# Patient Record
Sex: FEMALE | Race: OTHER | NOT HISPANIC OR LATINO
[De-identification: names, ages, dates, MRNs, and addresses within clinical notes are randomized per-mention and may not be internally consistent; named-entity substitution may affect disease eponyms.]

---

## 2018-01-17 ENCOUNTER — APPOINTMENT (OUTPATIENT)
Dept: ANTEPARTUM | Facility: CLINIC | Age: 48
End: 2018-01-17
Payer: SELF-PAY

## 2018-01-17 ENCOUNTER — ASOB RESULT (OUTPATIENT)
Age: 48
End: 2018-01-17

## 2018-01-17 PROBLEM — Z00.00 ENCOUNTER FOR PREVENTIVE HEALTH EXAMINATION: Status: ACTIVE | Noted: 2018-01-17

## 2018-01-17 PROCEDURE — 76830 TRANSVAGINAL US NON-OB: CPT

## 2018-01-17 PROCEDURE — 76857 US EXAM PELVIC LIMITED: CPT

## 2019-05-15 ENCOUNTER — ASOB RESULT (OUTPATIENT)
Age: 49
End: 2019-05-15

## 2019-05-15 ENCOUNTER — APPOINTMENT (OUTPATIENT)
Dept: ANTEPARTUM | Facility: CLINIC | Age: 49
End: 2019-05-15
Payer: SELF-PAY

## 2019-05-15 PROCEDURE — 76830 TRANSVAGINAL US NON-OB: CPT

## 2019-05-15 PROCEDURE — 76857 US EXAM PELVIC LIMITED: CPT | Mod: 59

## 2022-07-07 ENCOUNTER — NON-APPOINTMENT (OUTPATIENT)
Age: 52
End: 2022-07-07

## 2022-12-13 ENCOUNTER — APPOINTMENT (OUTPATIENT)
Dept: PULMONOLOGY | Facility: CLINIC | Age: 52
End: 2022-12-13

## 2023-02-16 ENCOUNTER — APPOINTMENT (OUTPATIENT)
Dept: PULMONOLOGY | Facility: CLINIC | Age: 53
End: 2023-02-16
Payer: MEDICAID

## 2023-02-16 VITALS
WEIGHT: 172 LBS | RESPIRATION RATE: 16 BRPM | HEIGHT: 63 IN | HEART RATE: 61 BPM | DIASTOLIC BLOOD PRESSURE: 70 MMHG | BODY MASS INDEX: 30.48 KG/M2 | SYSTOLIC BLOOD PRESSURE: 122 MMHG | OXYGEN SATURATION: 98 %

## 2023-02-16 DIAGNOSIS — Z82.49 FAMILY HISTORY OF ISCHEMIC HEART DISEASE AND OTHER DISEASES OF THE CIRCULATORY SYSTEM: ICD-10-CM

## 2023-02-16 DIAGNOSIS — Z86.39 PERSONAL HISTORY OF OTHER ENDOCRINE, NUTRITIONAL AND METABOLIC DISEASE: ICD-10-CM

## 2023-02-16 DIAGNOSIS — K21.9 GASTRO-ESOPHAGEAL REFLUX DISEASE W/OUT ESOPHAGITIS: ICD-10-CM

## 2023-02-16 DIAGNOSIS — Z86.018 PERSONAL HISTORY OF OTHER BENIGN NEOPLASM: ICD-10-CM

## 2023-02-16 DIAGNOSIS — Z86.79 PERSONAL HISTORY OF OTHER DISEASES OF THE CIRCULATORY SYSTEM: ICD-10-CM

## 2023-02-16 PROCEDURE — 99204 OFFICE O/P NEW MOD 45 MIN: CPT

## 2023-02-16 RX ORDER — METFORMIN ER 500 MG 500 MG/1
500 TABLET ORAL
Refills: 0 | Status: ACTIVE | COMMUNITY

## 2023-02-16 RX ORDER — PANTOPRAZOLE 40 MG/1
40 TABLET, DELAYED RELEASE ORAL
Refills: 0 | Status: ACTIVE | COMMUNITY

## 2023-02-16 RX ORDER — MULTIVIT-MIN/IRON/FOLIC ACID/K 18-600-40
CAPSULE ORAL
Refills: 0 | Status: ACTIVE | COMMUNITY

## 2023-02-16 RX ORDER — SEMAGLUTIDE 1.34 MG/ML
INJECTION, SOLUTION SUBCUTANEOUS
Refills: 0 | Status: ACTIVE | COMMUNITY

## 2023-02-16 RX ORDER — ASPIRIN 81 MG
81 TABLET, DELAYED RELEASE (ENTERIC COATED) ORAL
Refills: 0 | Status: ACTIVE | COMMUNITY

## 2023-02-16 RX ORDER — LEVOTHYROXINE SODIUM 0.07 MG/1
75 TABLET ORAL
Refills: 0 | Status: ACTIVE | COMMUNITY

## 2023-02-16 RX ORDER — METFORMIN HYDROCHLORIDE 500 MG/1
500 TABLET, COATED ORAL
Refills: 0 | Status: DISCONTINUED | COMMUNITY
End: 2023-02-16

## 2023-02-16 RX ORDER — ROSUVASTATIN CALCIUM 10 MG/1
10 TABLET, FILM COATED ORAL
Refills: 0 | Status: ACTIVE | COMMUNITY

## 2023-02-16 RX ORDER — PNV NO.95/FERROUS FUM/FOLIC AC 28MG-0.8MG
100 TABLET ORAL
Refills: 0 | Status: ACTIVE | COMMUNITY

## 2023-02-16 NOTE — ASSESSMENT
[FreeTextEntry1] : Long hx of asthma. Intermittent symptoms. Complicated by GERD, obesity. Some back and chest discomfort. Had cardiac w/u. Possible BARON.

## 2023-02-16 NOTE — HISTORY OF PRESENT ILLNESS
[Never] : never [TextBox_4] : Hx of asthma since childhood. Never hospitalized.\par \par Normally maintaned on prn albuterol. Needing it about twice per month. Main problem is in winter with wheezing. Some EDEN with heavier exertion like stairs. Occasional back and chest discomfort when laying down at night; mostly back. \par \par Saw cardiology Dr Abraham Gaitan; reports testing showed CAD but no intervention needed. \par \par Has hoarse voice; has ENT eval planned.\par \par She has GERD. On pantoprazole. \par \par She has EDS with ESS 11; does not know about snoring.\par \par Alot of stress; taking care of parents, works as H[employer].\par \par Never smoked. [ESS] : 11

## 2023-02-16 NOTE — PHYSICAL EXAM
[No Acute Distress] : no acute distress [Low Lying Soft Palate] : low lying soft palate [Elongated Uvula] : elongated uvula [Enlarged Base of the Tongue] : enlarged base of the tongue [III] : Mallampati Class: III [Supple] : supple [Normal Rate/Rhythm] : normal rate/rhythm [Normal S1, S2] : normal s1, s2 [No Resp Distress] : no resp distress [No Acc Muscle Use] : no acc muscle use [Clear to Auscultation Bilaterally] : clear to auscultation bilaterally [Benign] : benign [Normal Color/ Pigmentation] : normal color/ pigmentation [Oriented x3] : oriented x3 [Normal Mood] : normal mood [Normal Affect] : normal affect

## 2023-03-11 ENCOUNTER — OUTPATIENT (OUTPATIENT)
Dept: OUTPATIENT SERVICES | Facility: HOSPITAL | Age: 53
LOS: 1 days | End: 2023-03-11
Payer: COMMERCIAL

## 2023-03-11 DIAGNOSIS — G47.33 OBSTRUCTIVE SLEEP APNEA (ADULT) (PEDIATRIC): ICD-10-CM

## 2023-03-11 PROCEDURE — 95810 POLYSOM 6/> YRS 4/> PARAM: CPT | Mod: 26

## 2023-03-11 PROCEDURE — 95810 POLYSOM 6/> YRS 4/> PARAM: CPT

## 2023-04-11 ENCOUNTER — APPOINTMENT (OUTPATIENT)
Dept: PULMONOLOGY | Facility: CLINIC | Age: 53
End: 2023-04-11
Payer: MEDICAID

## 2023-04-11 VITALS — WEIGHT: 175 LBS | HEIGHT: 62 IN | BODY MASS INDEX: 32.2 KG/M2

## 2023-04-11 VITALS
OXYGEN SATURATION: 98 % | HEART RATE: 60 BPM | DIASTOLIC BLOOD PRESSURE: 70 MMHG | SYSTOLIC BLOOD PRESSURE: 130 MMHG | RESPIRATION RATE: 14 BRPM

## 2023-04-11 DIAGNOSIS — E66.9 OBESITY, UNSPECIFIED: ICD-10-CM

## 2023-04-11 DIAGNOSIS — R06.2 WHEEZING: ICD-10-CM

## 2023-04-11 PROCEDURE — 85018 HEMOGLOBIN: CPT | Mod: QW

## 2023-04-11 PROCEDURE — 94729 DIFFUSING CAPACITY: CPT

## 2023-04-11 PROCEDURE — 94010 BREATHING CAPACITY TEST: CPT

## 2023-04-11 PROCEDURE — 94727 GAS DIL/WSHOT DETER LNG VOL: CPT

## 2023-04-11 PROCEDURE — 99214 OFFICE O/P EST MOD 30 MIN: CPT | Mod: 25

## 2023-04-11 NOTE — PROCEDURE
[FreeTextEntry1] : PFT done today: normal\par \par CXR done 2/28/23: clear\par \par sleep study done 3/11/23: no BARON\par \par Labwork 2/28/23:\par d-dimer : 0.41\par CBC WNL\par Eos WNL\par Allergy panel: eggs, milk\par IgE 34\par Environmental allergies neg\par \par Labwor\par \par

## 2023-04-11 NOTE — HISTORY OF PRESENT ILLNESS
[Never] : never [TextBox_4] : Hx of asthma since childhood. Never hospitalized.\par \par Normally maintaned on prn albuterol. Needing it about twice per month. Main problem is in winter with wheezing. Some EDEN with heavier exertion like stairs. Occasional back and chest discomfort when laying down at night; mostly back. \par \par Saw cardiology Dr Abraham Gaitan; reports testing showed CAD but no intervention needed. \par \par Main complaint was cough, soar thraot, phlegm in throat. Given abx and prednisone by PCP. Has ENT eval planned. \par \par PFT and CXR normal. \par \par Feels well now.  \par \par She has GERD. On pantoprazole. \par \par She has EDS with ESS 11; does not know about snoring. Sleep study neg for BARON.\par \par Alot of stress; taking care of parents, works as HHA.\par \par Never smoked. [Lab] : lab [TextBox_100] : 3/11/23 [TextBox_108] : 1.9 [ESS] : 11

## 2023-04-11 NOTE — ASSESSMENT
[FreeTextEntry1] : Long hx of asthma. Intermittent symptoms. Complicated by GERD, obesity. Some back and chest discomfort. Had cardiac w/u.  Cough could be from upper airway source.

## 2023-09-07 ENCOUNTER — NON-APPOINTMENT (OUTPATIENT)
Age: 53
End: 2023-09-07

## 2023-09-07 ENCOUNTER — APPOINTMENT (OUTPATIENT)
Dept: ORTHOPEDIC SURGERY | Facility: CLINIC | Age: 53
End: 2023-09-07
Payer: MEDICAID

## 2023-09-07 VITALS — WEIGHT: 174 LBS | HEIGHT: 62 IN | BODY MASS INDEX: 32.02 KG/M2

## 2023-09-07 PROCEDURE — 20610 DRAIN/INJ JOINT/BURSA W/O US: CPT | Mod: LT

## 2023-09-07 PROCEDURE — 73564 X-RAY EXAM KNEE 4 OR MORE: CPT | Mod: LT

## 2023-09-07 PROCEDURE — 99204 OFFICE O/P NEW MOD 45 MIN: CPT | Mod: 25

## 2023-09-07 NOTE — DISCUSSION/SUMMARY
[de-identified] : Left knee internal derangement  Extensive discussion of the natural history of this issue was had with the patient.  We discussed the treatment options focusing on conservative therapy which includes anti-inflammatories, physical therapy/home exercise, & activity modification.   -A prescription for meloxicam with the appropriate warnings for GI, cardiac, and renal side effects was given to the patient.  Patient was instructed not to use any other NSAIDs with this medication. Patient elected for steroid injection today.  Did discuss this may raise her blood sugars so she will be careful monitor closely for the next 2 days. Patient will return if the pain returns or does not resolve.  The patient's current medication management of their orthopedic diagnosis was reviewed today: (1) We discussed a comprehensive treatment plan that included possible pharmaceutical management involving the use of prescription strength medications including but not limited to options such as oral Ibuprofen 400mg QID, once daily Meloxicam 15 mg, or 500-650 mg Tylenol versus over the counter oral medications and topical prescription NSAID Pennsaid vs over the counter Voltaren gel. (2) There is a moderate risk of morbidity with further treatment, especially from use of prescription strength medications and possible side effects of these medications which include upset stomach with oral medications, skin reactions to topical medications and cardiac/renal issues with long term use. (3) I recommended that the patient follow-up with their medical physician to discuss any significant specific potential issues with long term medication use such as interactions with current medications or with exacerbation of underlying medical comorbidities. (4) The benefits and risks associated with use of injectable, oral or topical, prescription and over the counter anti-inflammatory medications were discussed with the patient. The patient voiced understanding of the risks including but not limited to bleeding, stroke, kidney dysfunction, heart disease, and were referred to the black box warning label for further information.

## 2023-09-07 NOTE — PHYSICAL EXAM
[de-identified] : General Appearance: normal without acute distress Mental: Alert and oriented x 3 Psych/affect: appropriate, cooperative Gait: Mildly antalgic gait  Left lower extremity Hip: Normal ROM without pain on IR/ER Knee Inspection: Mild effusion, no erythema. Wounds: None. Alignment: neutral. Palpation: Nontender palpation ROM active (in degrees): 5-90, pain with deep flexion and full extension Ligamentous laxity: stable to varus stress test, stable to valgus stress test Meniscal Test: Positive McMurrays, positive Catarina. Muscle Test: good quad strength. [de-identified] : 9/7/23: Left knee xrays, standing AP/Lateral and Merchant films, and 45 degree PA standing view taken today in the office are reviewed and demonstrate preserved joint space, no abnormalities

## 2023-09-07 NOTE — PROCEDURE
[de-identified] : 9/7/23: Left knee injection - Steroid The risks, benefits, and alternatives of the injection were reviewed/discussed with the patient today in office and all of their questions were answered. We discussed possible side effects and efficacy of this injection.  The patient consented to the procedure.  Site and side were verified with the patient.  The inferolateral injection site was prepped with chloroprep.  Cold spray was utilized to numb the skin. Utilizing sterile technique, the knee was injected with 1 ml 40 mg methylprednisolone, 4 ml 1% Lidocaine, 5 mL 0.25% Bupivicaine. A sterile bandage was applied. The patient tolerated the procedure well and there were no complications.
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
## 2023-10-03 ENCOUNTER — APPOINTMENT (OUTPATIENT)
Dept: PULMONOLOGY | Facility: CLINIC | Age: 53
End: 2023-10-03
Payer: MEDICAID

## 2023-10-03 VITALS
HEART RATE: 63 BPM | OXYGEN SATURATION: 98 % | SYSTOLIC BLOOD PRESSURE: 122 MMHG | WEIGHT: 173 LBS | DIASTOLIC BLOOD PRESSURE: 78 MMHG | HEIGHT: 62 IN | BODY MASS INDEX: 31.83 KG/M2 | RESPIRATION RATE: 14 BRPM

## 2023-10-03 DIAGNOSIS — J45.909 UNSPECIFIED ASTHMA, UNCOMPLICATED: ICD-10-CM

## 2023-10-03 DIAGNOSIS — R07.89 OTHER CHEST PAIN: ICD-10-CM

## 2023-10-03 DIAGNOSIS — R05.9 COUGH, UNSPECIFIED: ICD-10-CM

## 2023-10-03 PROCEDURE — 99214 OFFICE O/P EST MOD 30 MIN: CPT

## 2023-10-21 ENCOUNTER — APPOINTMENT (OUTPATIENT)
Dept: CT IMAGING | Facility: CLINIC | Age: 53
End: 2023-10-21
Payer: MEDICAID

## 2023-10-21 ENCOUNTER — OUTPATIENT (OUTPATIENT)
Dept: OUTPATIENT SERVICES | Facility: HOSPITAL | Age: 53
LOS: 1 days | End: 2023-10-21

## 2023-10-21 DIAGNOSIS — R07.89 OTHER CHEST PAIN: ICD-10-CM

## 2023-10-21 PROCEDURE — 71250 CT THORAX DX C-: CPT | Mod: 26

## 2023-11-07 ENCOUNTER — APPOINTMENT (OUTPATIENT)
Dept: ORTHOPEDIC SURGERY | Facility: CLINIC | Age: 53
End: 2023-11-07

## 2023-11-09 ENCOUNTER — RX RENEWAL (OUTPATIENT)
Age: 53
End: 2023-11-09

## 2023-11-14 ENCOUNTER — APPOINTMENT (OUTPATIENT)
Dept: ORTHOPEDIC SURGERY | Facility: CLINIC | Age: 53
End: 2023-11-14
Payer: MEDICAID

## 2023-11-14 VITALS
SYSTOLIC BLOOD PRESSURE: 124 MMHG | HEIGHT: 63 IN | DIASTOLIC BLOOD PRESSURE: 82 MMHG | WEIGHT: 171 LBS | HEART RATE: 66 BPM | BODY MASS INDEX: 30.3 KG/M2

## 2023-11-14 DIAGNOSIS — M54.16 RADICULOPATHY, LUMBAR REGION: ICD-10-CM

## 2023-11-14 PROCEDURE — 72110 X-RAY EXAM L-2 SPINE 4/>VWS: CPT

## 2023-11-14 PROCEDURE — 99204 OFFICE O/P NEW MOD 45 MIN: CPT | Mod: 25

## 2023-11-14 RX ORDER — GABAPENTIN 300 MG/1
300 CAPSULE ORAL
Qty: 30 | Refills: 1 | Status: ACTIVE | COMMUNITY
Start: 2023-11-14 | End: 1900-01-01

## 2023-11-20 ENCOUNTER — APPOINTMENT (OUTPATIENT)
Dept: ORTHOPEDIC SURGERY | Facility: CLINIC | Age: 53
End: 2023-11-20
Payer: MEDICAID

## 2023-11-20 VITALS
DIASTOLIC BLOOD PRESSURE: 77 MMHG | BODY MASS INDEX: 30.3 KG/M2 | WEIGHT: 171 LBS | HEIGHT: 63 IN | SYSTOLIC BLOOD PRESSURE: 154 MMHG | HEART RATE: 72 BPM

## 2023-11-20 DIAGNOSIS — M23.92 UNSPECIFIED INTERNAL DERANGEMENT OF LEFT KNEE: ICD-10-CM

## 2023-11-20 PROCEDURE — 99214 OFFICE O/P EST MOD 30 MIN: CPT

## 2023-11-20 RX ORDER — MELOXICAM 15 MG/1
15 TABLET ORAL
Qty: 30 | Refills: 0 | Status: ACTIVE | COMMUNITY
Start: 2023-09-07 | End: 1900-01-01

## 2023-12-14 ENCOUNTER — APPOINTMENT (OUTPATIENT)
Dept: ORTHOPEDIC SURGERY | Facility: CLINIC | Age: 53
End: 2023-12-14

## 2023-12-14 ENCOUNTER — APPOINTMENT (OUTPATIENT)
Dept: PULMONOLOGY | Facility: CLINIC | Age: 53
End: 2023-12-14

## 2023-12-20 ENCOUNTER — APPOINTMENT (OUTPATIENT)
Dept: ORTHOPEDIC SURGERY | Facility: CLINIC | Age: 53
End: 2023-12-20

## 2024-03-14 ENCOUNTER — APPOINTMENT (OUTPATIENT)
Dept: ORTHOPEDIC SURGERY | Facility: CLINIC | Age: 54
End: 2024-03-14

## 2024-03-25 DIAGNOSIS — M79.641 PAIN IN RIGHT HAND: ICD-10-CM

## 2024-03-26 ENCOUNTER — APPOINTMENT (OUTPATIENT)
Dept: ORTHOPEDIC SURGERY | Facility: CLINIC | Age: 54
End: 2024-03-26
Payer: COMMERCIAL

## 2024-03-26 DIAGNOSIS — M79.641 PAIN IN RIGHT HAND: ICD-10-CM

## 2024-03-26 PROCEDURE — 99215 OFFICE O/P EST HI 40 MIN: CPT | Mod: 25

## 2024-03-26 PROCEDURE — 73130 X-RAY EXAM OF HAND: CPT | Mod: RT

## 2024-03-26 PROCEDURE — 20526 THER INJECTION CARP TUNNEL: CPT | Mod: RT

## 2024-03-26 NOTE — ASSESSMENT
[FreeTextEntry1] : ASSESSMENT: The patient comes in today with chronic worsening exacerbated findings and symptoms consistent with carpal tunnel disease.  At this point we have discussed activity modification and she elects for an injection.  She has had left carpal tunnel release in the past   The patient was adequately and thoroughly informed of my assessment of their current condition(s).  - This may diminish bodily function for the extremity. We discussed prognosis, tx modalities including operative and nonoperative options for the above diagnostic assessment. As always, 2nd opinion is always provided as an option.  When accessible, I was able to review other physicians note(s) including reviewing other tests, imaging results as well as personally view these results for my own interpretation.   Injection:   The risks and benefits of a steroid injection were discussed in detail. The risks include but are not limited to: pain, infection, swelling, flare response, bleeding, subcutaneous fat atrophy, skin depigmentation and/or elevation of blood sugar. The risk of incomplete resolution of symptoms, recurrence and additional intervention was reviewed and considered by the patient. The patient agreed to proceed and under a sterile prep, I injected 1 unit 6mg into 1 cc of a combination of Celestone and Lidocaine into the right carpal tunnel. The patient tolerated the injection well. The patient was adequately and thoroughly informed of my assessment of their current condition(s).  DISCUSSION: 1.  Injection as above.  Activity modification. 2. [x] 3. [x]

## 2024-03-26 NOTE — PHYSICAL EXAM
[de-identified] : Examination of the [right] wrist reveals discomfort with compression at the level of the volar carpal tunnel eliciting numbness/tingling throughout the fingertips   [de-identified] : [4] views of [bilateral hands and wrists] were obtained today in my office and were seen by me and discussed with the patient.  These negative

## 2024-03-26 NOTE — REASON FOR VISIT
[Hand Pain] : hand pain [Initial Visit] : an initial visit for [Carpal Tunnel Syndrome] : Carpal Tunnel Syndrome

## 2024-03-26 NOTE — HISTORY OF PRESENT ILLNESS
[FreeTextEntry1] : Steff is a 53-year-old female who presents today with a longstanding chronic history of right wrist and hand discomfort numbness and tingling.  She has a history of left sided carpal tunnel release in the past.  It is inhibiting ADLs

## 2024-04-19 ENCOUNTER — APPOINTMENT (OUTPATIENT)
Dept: ORTHOPEDIC SURGERY | Facility: CLINIC | Age: 54
End: 2024-04-19
Payer: COMMERCIAL

## 2024-04-19 VITALS
SYSTOLIC BLOOD PRESSURE: 145 MMHG | DIASTOLIC BLOOD PRESSURE: 88 MMHG | BODY MASS INDEX: 30.3 KG/M2 | HEIGHT: 63 IN | HEART RATE: 70 BPM | WEIGHT: 171 LBS

## 2024-04-19 DIAGNOSIS — M43.10 SPONDYLOLISTHESIS, SITE UNSPECIFIED: ICD-10-CM

## 2024-04-19 DIAGNOSIS — S13.4XXA SPRAIN OF LIGAMENTS OF CERVICAL SPINE, INITIAL ENCOUNTER: ICD-10-CM

## 2024-04-19 DIAGNOSIS — M47.816 SPONDYLOSIS W/OUT MYELOPATHY OR RADICULOPATHY, LUMBAR REGION: ICD-10-CM

## 2024-04-19 PROCEDURE — 72050 X-RAY EXAM NECK SPINE 4/5VWS: CPT

## 2024-04-19 PROCEDURE — 99214 OFFICE O/P EST MOD 30 MIN: CPT

## 2024-04-19 RX ORDER — MELOXICAM 15 MG/1
15 TABLET ORAL DAILY
Qty: 30 | Refills: 2 | Status: ACTIVE | COMMUNITY
Start: 2024-04-19 | End: 1900-01-01

## 2024-04-19 RX ORDER — ACETAMINOPHEN 500 MG/1
500 TABLET ORAL 3 TIMES DAILY
Qty: 180 | Refills: 1 | Status: ACTIVE | COMMUNITY
Start: 2024-04-19 | End: 1900-01-01

## 2024-04-22 ENCOUNTER — NON-APPOINTMENT (OUTPATIENT)
Age: 54
End: 2024-04-22

## 2024-05-24 ENCOUNTER — APPOINTMENT (OUTPATIENT)
Dept: ORTHOPEDIC SURGERY | Facility: CLINIC | Age: 54
End: 2024-05-24
Payer: COMMERCIAL

## 2024-05-24 VITALS
HEIGHT: 63 IN | SYSTOLIC BLOOD PRESSURE: 124 MMHG | DIASTOLIC BLOOD PRESSURE: 75 MMHG | BODY MASS INDEX: 30.3 KG/M2 | WEIGHT: 171 LBS

## 2024-05-24 DIAGNOSIS — G56.00 CARPAL TUNNEL SYNDROME, UNSPECIFIED UPPER LIMB: ICD-10-CM

## 2024-05-24 DIAGNOSIS — M25.531 PAIN IN RIGHT WRIST: ICD-10-CM

## 2024-05-24 PROCEDURE — 99214 OFFICE O/P EST MOD 30 MIN: CPT | Mod: 25

## 2024-05-24 PROCEDURE — 20526 THER INJECTION CARP TUNNEL: CPT | Mod: RT

## 2024-05-24 NOTE — HISTORY OF PRESENT ILLNESS
[FreeTextEntry1] : Steff is a 53-year-old female who presents today with a longstanding chronic history of right wrist and hand discomfort numbness and tingling. She has a history of left sided carpal tunnel release in the past. It is inhibiting ADLs.  She describes tremendous relief with her injection last visit, however symptoms have returned.

## 2024-05-24 NOTE — PHYSICAL EXAM
[de-identified] : Examination of the [right] wrist reveals discomfort with compression at the level of the volar carpal tunnel eliciting numbness/tingling throughout the fingertips [de-identified] : [4] views of [bilateral hands and wrists] were reviewed today in my office and were seen by me and discussed with the patient. These negative.

## 2024-05-24 NOTE — ASSESSMENT
[FreeTextEntry1] : ASSESSMENT: The patient comes in today with chronic worsening exacerbated findings and symptoms consistent with carpal tunnel disease.  She describes some relief with her injection last visit, however symptoms have returned.  At this point we have discussed activity modification and she elects for a repeat injection. She has had left carpal tunnel release in the past.  The patient was adequately and thoroughly informed of my assessment of their current condition(s).  - This may diminish bodily function for the extremity.  We discussed prognosis, treatment modalities including operative and nonoperative options for the above diagnostic assessment. As always, 2nd opinion is always provided as an option. For this, when accessible, I was able to review other physicians note(s) including reviewing other tests, imaging results as well as personally view these results for my own interpretation.      Injection:   The risks and benefits of a steroid injection were discussed in detail. The risks include but are not limited to: pain, infection, swelling, flare response, bleeding, subcutaneous fat atrophy, skin depigmentation and/or elevation of blood sugar. The risk of incomplete resolution of symptoms, recurrence and additional intervention was reviewed and considered by the patient.  The patient agreed to proceed and under a sterile prep, I injected 1 unit (6mg) into 1 cc of a combination of Celestone and Lidocaine into the [right carpal tunnel]. The patient tolerated the injection well.   The patient was adequately and thoroughly informed of my assessment of their current condition(s).   DISCUSSION: 1.  Injection as above.  Activity modifications. 2. [x] 3. [x]

## 2024-09-11 ENCOUNTER — NON-APPOINTMENT (OUTPATIENT)
Age: 54
End: 2024-09-11

## 2024-09-19 ENCOUNTER — NON-APPOINTMENT (OUTPATIENT)
Age: 54
End: 2024-09-19

## 2024-09-26 ENCOUNTER — APPOINTMENT (OUTPATIENT)
Dept: ORTHOPEDIC SURGERY | Facility: CLINIC | Age: 54
End: 2024-09-26
Payer: COMMERCIAL

## 2024-09-26 VITALS — WEIGHT: 180 LBS | BODY MASS INDEX: 31.89 KG/M2 | HEIGHT: 63 IN

## 2024-09-26 DIAGNOSIS — M47.816 SPONDYLOSIS W/OUT MYELOPATHY OR RADICULOPATHY, LUMBAR REGION: ICD-10-CM

## 2024-09-26 DIAGNOSIS — M54.16 RADICULOPATHY, LUMBAR REGION: ICD-10-CM

## 2024-09-26 PROCEDURE — 99213 OFFICE O/P EST LOW 20 MIN: CPT

## 2024-09-26 RX ORDER — MELOXICAM 15 MG/1
15 TABLET ORAL DAILY
Qty: 30 | Refills: 1 | Status: ACTIVE | COMMUNITY
Start: 2024-09-26 | End: 1900-01-01

## 2024-09-26 NOTE — DISCUSSION/SUMMARY
[de-identified] : 54-year-old female with lumbar spondylosis and left lower extremity radiculopathy I discussed with Steff that she has been doing the symptoms for quite some time she responds really well to meloxicam and we will continue this on an as-needed basis however moving forward we will consider more advanced imaging if she does not respond to physical therapy and she has to continue to take the meloxicam A [facility] service referral was placed to assist with scheduling location I will see her back in 2 months and if her symptoms are still prominent then we will move forward with more advanced imaging such as a lumbar MRI
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
## 2024-12-18 ENCOUNTER — INPATIENT (INPATIENT)
Facility: HOSPITAL | Age: 54
LOS: 22 days | Discharge: HOME HEALTH | DRG: 563 | End: 2025-01-10
Attending: STUDENT IN AN ORGANIZED HEALTH CARE EDUCATION/TRAINING PROGRAM | Admitting: STUDENT IN AN ORGANIZED HEALTH CARE EDUCATION/TRAINING PROGRAM
Payer: SELF-PAY

## 2024-12-18 VITALS — WEIGHT: 184.97 LBS

## 2024-12-18 DIAGNOSIS — S82.891A OTHER FRACTURE OF RIGHT LOWER LEG, INITIAL ENCOUNTER FOR CLOSED FRACTURE: ICD-10-CM

## 2024-12-18 LAB
ALBUMIN SERPL ELPH-MCNC: 4.6 G/DL — SIGNIFICANT CHANGE UP (ref 3.3–5.2)
ALP SERPL-CCNC: 90 U/L — SIGNIFICANT CHANGE UP (ref 40–120)
ALT FLD-CCNC: 66 U/L — HIGH
ANION GAP SERPL CALC-SCNC: 15 MMOL/L — SIGNIFICANT CHANGE UP (ref 5–17)
APTT BLD: 25.3 SEC — SIGNIFICANT CHANGE UP (ref 24.5–35.6)
AST SERPL-CCNC: 66 U/L — HIGH
BASOPHILS # BLD AUTO: 0.03 K/UL — SIGNIFICANT CHANGE UP (ref 0–0.2)
BASOPHILS NFR BLD AUTO: 0.4 % — SIGNIFICANT CHANGE UP (ref 0–2)
BILIRUB SERPL-MCNC: 0.8 MG/DL — SIGNIFICANT CHANGE UP (ref 0.4–2)
BLD GP AB SCN SERPL QL: SIGNIFICANT CHANGE UP
BUN SERPL-MCNC: 15.4 MG/DL — SIGNIFICANT CHANGE UP (ref 8–20)
CALCIUM SERPL-MCNC: 10.2 MG/DL — SIGNIFICANT CHANGE UP (ref 8.4–10.5)
CHLORIDE SERPL-SCNC: 101 MMOL/L — SIGNIFICANT CHANGE UP (ref 96–108)
CO2 SERPL-SCNC: 24 MMOL/L — SIGNIFICANT CHANGE UP (ref 22–29)
CREAT SERPL-MCNC: 0.64 MG/DL — SIGNIFICANT CHANGE UP (ref 0.5–1.3)
EGFR: 106 ML/MIN/1.73M2 — SIGNIFICANT CHANGE UP
EOSINOPHIL # BLD AUTO: 0.17 K/UL — SIGNIFICANT CHANGE UP (ref 0–0.5)
EOSINOPHIL NFR BLD AUTO: 2.5 % — SIGNIFICANT CHANGE UP (ref 0–6)
ETHANOL SERPL-MCNC: <10 MG/DL — SIGNIFICANT CHANGE UP (ref 0–9)
GLUCOSE BLDC GLUCOMTR-MCNC: 187 MG/DL — HIGH (ref 70–99)
GLUCOSE SERPL-MCNC: 168 MG/DL — HIGH (ref 70–99)
HCT VFR BLD CALC: 38.5 % — SIGNIFICANT CHANGE UP (ref 34.5–45)
HGB BLD-MCNC: 12.9 G/DL — SIGNIFICANT CHANGE UP (ref 11.5–15.5)
IMM GRANULOCYTES NFR BLD AUTO: 0.7 % — SIGNIFICANT CHANGE UP (ref 0–0.9)
INR BLD: 0.95 RATIO — SIGNIFICANT CHANGE UP (ref 0.85–1.16)
LIDOCAIN IGE QN: 57 U/L — HIGH (ref 22–51)
LYMPHOCYTES # BLD AUTO: 3.14 K/UL — SIGNIFICANT CHANGE UP (ref 1–3.3)
LYMPHOCYTES # BLD AUTO: 46.7 % — HIGH (ref 13–44)
MCHC RBC-ENTMCNC: 29.3 PG — SIGNIFICANT CHANGE UP (ref 27–34)
MCHC RBC-ENTMCNC: 33.5 G/DL — SIGNIFICANT CHANGE UP (ref 32–36)
MCV RBC AUTO: 87.5 FL — SIGNIFICANT CHANGE UP (ref 80–100)
MONOCYTES # BLD AUTO: 0.64 K/UL — SIGNIFICANT CHANGE UP (ref 0–0.9)
MONOCYTES NFR BLD AUTO: 9.5 % — SIGNIFICANT CHANGE UP (ref 2–14)
NEUTROPHILS # BLD AUTO: 2.7 K/UL — SIGNIFICANT CHANGE UP (ref 1.8–7.4)
NEUTROPHILS NFR BLD AUTO: 40.2 % — LOW (ref 43–77)
PLATELET # BLD AUTO: 188 K/UL — SIGNIFICANT CHANGE UP (ref 150–400)
POTASSIUM SERPL-MCNC: 3.6 MMOL/L — SIGNIFICANT CHANGE UP (ref 3.5–5.3)
POTASSIUM SERPL-SCNC: 3.6 MMOL/L — SIGNIFICANT CHANGE UP (ref 3.5–5.3)
PROT SERPL-MCNC: 7.2 G/DL — SIGNIFICANT CHANGE UP (ref 6.6–8.7)
PROTHROM AB SERPL-ACNC: 10.7 SEC — SIGNIFICANT CHANGE UP (ref 9.9–13.4)
RBC # BLD: 4.4 M/UL — SIGNIFICANT CHANGE UP (ref 3.8–5.2)
RBC # FLD: 13.2 % — SIGNIFICANT CHANGE UP (ref 10.3–14.5)
SODIUM SERPL-SCNC: 140 MMOL/L — SIGNIFICANT CHANGE UP (ref 135–145)
WBC # BLD: 6.73 K/UL — SIGNIFICANT CHANGE UP (ref 3.8–10.5)
WBC # FLD AUTO: 6.73 K/UL — SIGNIFICANT CHANGE UP (ref 3.8–10.5)

## 2024-12-18 PROCEDURE — 71045 X-RAY EXAM CHEST 1 VIEW: CPT | Mod: 26

## 2024-12-18 PROCEDURE — 73600 X-RAY EXAM OF ANKLE: CPT | Mod: 26,RT

## 2024-12-18 PROCEDURE — 73090 X-RAY EXAM OF FOREARM: CPT | Mod: 26,LT

## 2024-12-18 PROCEDURE — 70496 CT ANGIOGRAPHY HEAD: CPT | Mod: 26,MC

## 2024-12-18 PROCEDURE — 74177 CT ABD & PELVIS W/CONTRAST: CPT | Mod: 26,MC

## 2024-12-18 PROCEDURE — 99284 EMERGENCY DEPT VISIT MOD MDM: CPT

## 2024-12-18 PROCEDURE — 70498 CT ANGIOGRAPHY NECK: CPT | Mod: 26,MC

## 2024-12-18 PROCEDURE — 70450 CT HEAD/BRAIN W/O DYE: CPT | Mod: 26,MC,59

## 2024-12-18 PROCEDURE — 73090 X-RAY EXAM OF FOREARM: CPT | Mod: 26,LT,77

## 2024-12-18 PROCEDURE — 99223 1ST HOSP IP/OBS HIGH 75: CPT | Mod: 57

## 2024-12-18 PROCEDURE — 72125 CT NECK SPINE W/O DYE: CPT | Mod: 26,MC

## 2024-12-18 PROCEDURE — 99291 CRITICAL CARE FIRST HOUR: CPT | Mod: 25

## 2024-12-18 PROCEDURE — 73110 X-RAY EXAM OF WRIST: CPT | Mod: 26,RT

## 2024-12-18 PROCEDURE — 73700 CT LOWER EXTREMITY W/O DYE: CPT | Mod: 26,RT

## 2024-12-18 PROCEDURE — 71260 CT THORAX DX C+: CPT | Mod: 26,MC

## 2024-12-18 PROCEDURE — 73110 X-RAY EXAM OF WRIST: CPT | Mod: 26,RT,77

## 2024-12-18 PROCEDURE — 99156 MOD SED OTH PHYS/QHP 5/>YRS: CPT

## 2024-12-18 PROCEDURE — 73610 X-RAY EXAM OF ANKLE: CPT | Mod: 26,RT

## 2024-12-18 RX ORDER — SODIUM CHLORIDE 9 MG/ML
1000 INJECTION, SOLUTION INTRAVENOUS
Refills: 0 | Status: DISCONTINUED | OUTPATIENT
Start: 2024-12-18 | End: 2024-12-19

## 2024-12-18 RX ORDER — DEXTROSE MONOHYDRATE 25 G/50ML
25 INJECTION, SOLUTION INTRAVENOUS ONCE
Refills: 0 | Status: DISCONTINUED | OUTPATIENT
Start: 2024-12-18 | End: 2024-12-19

## 2024-12-18 RX ORDER — PROPOFOL 10 MG/ML
80 INJECTION, EMULSION INTRAVENOUS ONCE
Refills: 0 | Status: COMPLETED | OUTPATIENT
Start: 2024-12-18 | End: 2024-12-18

## 2024-12-18 RX ORDER — METHOCARBAMOL 500 MG
750 TABLET ORAL EVERY 6 HOURS
Refills: 0 | Status: DISCONTINUED | OUTPATIENT
Start: 2024-12-18 | End: 2024-12-19

## 2024-12-18 RX ORDER — DEXTROSE MONOHYDRATE 25 G/50ML
12.5 INJECTION, SOLUTION INTRAVENOUS ONCE
Refills: 0 | Status: DISCONTINUED | OUTPATIENT
Start: 2024-12-18 | End: 2024-12-19

## 2024-12-18 RX ORDER — CEFAZOLIN SODIUM 1 G
2000 VIAL (EA) INJECTION EVERY 8 HOURS
Refills: 0 | Status: DISCONTINUED | OUTPATIENT
Start: 2024-12-18 | End: 2024-12-18

## 2024-12-18 RX ORDER — CLOSTRIDIUM TETANI TOXOID ANTIGEN (FORMALDEHYDE INACTIVATED), CORYNEBACTERIUM DIPHTHERIAE TOXOID ANTIGEN (FORMALDEHYDE INACTIVATED), BORDETELLA PERTUSSIS TOXOID ANTIGEN (GLUTARALDEHYDE INACTIVATED), BORDETELLA PERTUSSIS FILAMENTOUS HEMAGGLUTININ ANTIGEN (FORMALDEHYDE INACTIVATED), BORDETELLA PERTUSSIS PERTACTIN ANTIGEN, AND BORDETELLA PERTUSSIS FIMBRIAE 2/3 ANTIGEN 5; 2; 2.5; 5; 3; 5 [LF]/.5ML; [LF]/.5ML; UG/.5ML; UG/.5ML; UG/.5ML; UG/.5ML
0.5 INJECTION, SUSPENSION INTRAMUSCULAR ONCE
Refills: 0 | Status: COMPLETED | OUTPATIENT
Start: 2024-12-18 | End: 2024-12-18

## 2024-12-18 RX ORDER — SODIUM CHLORIDE 9 MG/ML
1000 INJECTION, SOLUTION INTRAVENOUS ONCE
Refills: 0 | Status: COMPLETED | OUTPATIENT
Start: 2024-12-18 | End: 2024-12-18

## 2024-12-18 RX ORDER — GLUCAGON INJECTION, SOLUTION 0.5 MG/.1ML
1 INJECTION, SOLUTION SUBCUTANEOUS ONCE
Refills: 0 | Status: DISCONTINUED | OUTPATIENT
Start: 2024-12-18 | End: 2024-12-19

## 2024-12-18 RX ORDER — CEFTRIAXONE SODIUM 1 G/1
2000 INJECTION, POWDER, FOR SOLUTION INTRAMUSCULAR; INTRAVENOUS EVERY 24 HOURS
Refills: 0 | Status: DISCONTINUED | OUTPATIENT
Start: 2024-12-19 | End: 2024-12-19

## 2024-12-18 RX ORDER — SODIUM CHLORIDE 9 MG/ML
1000 INJECTION, SOLUTION INTRAVENOUS
Refills: 0 | Status: DISCONTINUED | OUTPATIENT
Start: 2024-12-18 | End: 2024-12-20

## 2024-12-18 RX ORDER — CHLORHEXIDINE GLUCONATE 1.2 MG/ML
1 RINSE ORAL EVERY 12 HOURS
Refills: 0 | Status: DISCONTINUED | OUTPATIENT
Start: 2024-12-18 | End: 2024-12-19

## 2024-12-18 RX ORDER — VANCOMYCIN HYDROCHLORIDE 5 G/100ML
1250 INJECTION, POWDER, LYOPHILIZED, FOR SOLUTION INTRAVENOUS ONCE
Refills: 0 | Status: DISCONTINUED | OUTPATIENT
Start: 2024-12-19 | End: 2024-12-26

## 2024-12-18 RX ORDER — INFLUENZA A VIRUS A/WISCONSIN/588/2019 (H1N1) RECOMBINANT HEMAGGLUTININ ANTIGEN, INFLUENZA A VIRUS A/DARWIN/6/2021 (H3N2) RECOMBINANT HEMAGGLUTININ ANTIGEN, INFLUENZA B VIRUS B/AUSTRIA/1359417/2021 RECOMBINANT HEMAGGLUTININ ANTIGEN, AND INFLUENZA B VIRUS B/PHUKET/3073/2013 RECOMBINANT HEMAGGLUTININ ANTIGEN 45; 45; 45; 45 UG/.5ML; UG/.5ML; UG/.5ML; UG/.5ML
0.5 INJECTION INTRAMUSCULAR ONCE
Refills: 0 | Status: DISCONTINUED | OUTPATIENT
Start: 2024-12-18 | End: 2025-01-10

## 2024-12-18 RX ORDER — CEFTRIAXONE SODIUM 1 G/1
2000 INJECTION, POWDER, FOR SOLUTION INTRAMUSCULAR; INTRAVENOUS ONCE
Refills: 0 | Status: COMPLETED | OUTPATIENT
Start: 2024-12-18 | End: 2024-12-18

## 2024-12-18 RX ORDER — PROPOFOL 10 MG/ML
40 INJECTION, EMULSION INTRAVENOUS ONCE
Refills: 0 | Status: COMPLETED | OUTPATIENT
Start: 2024-12-18 | End: 2024-12-18

## 2024-12-18 RX ORDER — MUPIROCIN 2 %
1 OINTMENT (GRAM) TOPICAL
Refills: 0 | Status: DISCONTINUED | OUTPATIENT
Start: 2024-12-18 | End: 2024-12-19

## 2024-12-18 RX ORDER — ACETAMINOPHEN 80 MG/.8ML
1000 SOLUTION/ DROPS ORAL EVERY 6 HOURS
Refills: 0 | Status: COMPLETED | OUTPATIENT
Start: 2024-12-18 | End: 2024-12-19

## 2024-12-18 RX ORDER — FENTANYL 75 UG/H
50 PATCH, EXTENDED RELEASE TRANSDERMAL ONCE
Refills: 0 | Status: DISCONTINUED | OUTPATIENT
Start: 2024-12-18 | End: 2024-12-18

## 2024-12-18 RX ORDER — ENOXAPARIN SODIUM 60 MG/.6ML
40 INJECTION INTRAVENOUS; SUBCUTANEOUS ONCE
Refills: 0 | Status: COMPLETED | OUTPATIENT
Start: 2024-12-18 | End: 2024-12-18

## 2024-12-18 RX ORDER — OXYCODONE HCL 15 MG
10 TABLET ORAL EVERY 4 HOURS
Refills: 0 | Status: DISCONTINUED | OUTPATIENT
Start: 2024-12-18 | End: 2024-12-19

## 2024-12-18 RX ORDER — KETOROLAC TROMETHAMINE 30 MG/ML
15 INJECTION INTRAMUSCULAR; INTRAVENOUS EVERY 6 HOURS
Refills: 0 | Status: DISCONTINUED | OUTPATIENT
Start: 2024-12-18 | End: 2024-12-19

## 2024-12-18 RX ORDER — PROPOFOL 10 MG/ML
60 INJECTION, EMULSION INTRAVENOUS ONCE
Refills: 0 | Status: COMPLETED | OUTPATIENT
Start: 2024-12-18 | End: 2024-12-18

## 2024-12-18 RX ORDER — POVIDONE IODINE USP, 10% W/W 10 MG/ML
1 SWAB TOPICAL ONCE
Refills: 0 | Status: COMPLETED | OUTPATIENT
Start: 2024-12-18 | End: 2024-12-19

## 2024-12-18 RX ORDER — INSULIN LISPRO 100/ML
VIAL (ML) SUBCUTANEOUS
Refills: 0 | Status: DISCONTINUED | OUTPATIENT
Start: 2024-12-18 | End: 2024-12-19

## 2024-12-18 RX ORDER — PROPOFOL 10 MG/ML
20 INJECTION, EMULSION INTRAVENOUS ONCE
Refills: 0 | Status: COMPLETED | OUTPATIENT
Start: 2024-12-18 | End: 2024-12-18

## 2024-12-18 RX ORDER — DEXTROSE MONOHYDRATE 25 G/50ML
15 INJECTION, SOLUTION INTRAVENOUS ONCE
Refills: 0 | Status: DISCONTINUED | OUTPATIENT
Start: 2024-12-18 | End: 2024-12-19

## 2024-12-18 RX ORDER — OXYCODONE HCL 15 MG
5 TABLET ORAL EVERY 4 HOURS
Refills: 0 | Status: DISCONTINUED | OUTPATIENT
Start: 2024-12-18 | End: 2024-12-19

## 2024-12-18 RX ORDER — LIDOCAINE 50 MG/G
1 OINTMENT TOPICAL EVERY 24 HOURS
Refills: 0 | Status: DISCONTINUED | OUTPATIENT
Start: 2024-12-18 | End: 2024-12-19

## 2024-12-18 RX ORDER — LEVOTHYROXINE SODIUM 175 UG/1
100 TABLET ORAL
Refills: 0 | Status: DISCONTINUED | OUTPATIENT
Start: 2024-12-18 | End: 2024-12-19

## 2024-12-18 RX ADMIN — SODIUM CHLORIDE 100 MILLILITER(S): 9 INJECTION, SOLUTION INTRAVENOUS at 20:29

## 2024-12-18 RX ADMIN — SODIUM CHLORIDE 1000 MILLILITER(S): 9 INJECTION, SOLUTION INTRAVENOUS at 20:47

## 2024-12-18 RX ADMIN — LIDOCAINE 1 PATCH: 50 OINTMENT TOPICAL at 20:43

## 2024-12-18 RX ADMIN — FENTANYL 50 MICROGRAM(S): 75 PATCH, EXTENDED RELEASE TRANSDERMAL at 17:50

## 2024-12-18 RX ADMIN — CLOSTRIDIUM TETANI TOXOID ANTIGEN (FORMALDEHYDE INACTIVATED), CORYNEBACTERIUM DIPHTHERIAE TOXOID ANTIGEN (FORMALDEHYDE INACTIVATED), BORDETELLA PERTUSSIS TOXOID ANTIGEN (GLUTARALDEHYDE INACTIVATED), BORDETELLA PERTUSSIS FILAMENTOUS HEMAGGLUTININ ANTIGEN (FORMALDEHYDE INACTIVATED), BORDETELLA PERTUSSIS PERTACTIN ANTIGEN, AND BORDETELLA PERTUSSIS FIMBRIAE 2/3 ANTIGEN 0.5 MILLILITER(S): 5; 2; 2.5; 5; 3; 5 INJECTION, SUSPENSION INTRAMUSCULAR at 19:27

## 2024-12-18 RX ADMIN — Medication 750 MILLIGRAM(S): at 22:47

## 2024-12-18 RX ADMIN — PROPOFOL 80 MILLIGRAM(S): 10 INJECTION, EMULSION INTRAVENOUS at 18:54

## 2024-12-18 RX ADMIN — PROPOFOL 20 MILLIGRAM(S): 10 INJECTION, EMULSION INTRAVENOUS at 18:53

## 2024-12-18 RX ADMIN — ENOXAPARIN SODIUM 40 MILLIGRAM(S): 60 INJECTION INTRAVENOUS; SUBCUTANEOUS at 20:48

## 2024-12-18 RX ADMIN — ACETAMINOPHEN 400 MILLIGRAM(S): 80 SOLUTION/ DROPS ORAL at 22:47

## 2024-12-18 RX ADMIN — KETOROLAC TROMETHAMINE 15 MILLIGRAM(S): 30 INJECTION INTRAMUSCULAR; INTRAVENOUS at 22:47

## 2024-12-18 RX ADMIN — PROPOFOL 60 MILLIGRAM(S): 10 INJECTION, EMULSION INTRAVENOUS at 18:53

## 2024-12-18 RX ADMIN — PROPOFOL 40 MILLIGRAM(S): 10 INJECTION, EMULSION INTRAVENOUS at 18:55

## 2024-12-18 RX ADMIN — Medication 10 MILLIGRAM(S): at 21:09

## 2024-12-18 RX ADMIN — CEFTRIAXONE SODIUM 2000 MILLIGRAM(S): 1 INJECTION, POWDER, FOR SOLUTION INTRAMUSCULAR; INTRAVENOUS at 17:15

## 2024-12-18 RX ADMIN — PROPOFOL 40 MILLIGRAM(S): 10 INJECTION, EMULSION INTRAVENOUS at 18:57

## 2024-12-18 NOTE — ED PROVIDER NOTE - PHYSICAL EXAMINATION
Const: In mild painful distress  HEENT: Small abrasion to forehead. No scalp hematoma. Face is midline.   Eyes: 2 mm and PERRLA.. EOMI.  Neck:. No midline cervical tenderness, C collar in place.   Cardiac: Regular rate and regular rhythm. +S1/S2. No murmurs, rubs or gallops. Peripheral pulses 2+ and symmetric. No LE edema.  Resp: Speaking in full sentences, breath sounds equal and clear bilaterally. No wheezes, rales or rhonchi.  Abd: Soft, non-tender, non-distended.  No guarding or rebound.  MSK:   Tenderness over the anterior chest wall.  No underlying crepitus.  Deformity and pain of the right wrist, full range of motion of fingers.  Deformity and pain to right lateral malleoli with swelling, laceration overlying the area.  Neurovascularly intact  Neuro:  5 out of 5 strength, bilateral upper and lower extremities.  GCS 15
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
## 2024-12-18 NOTE — PROGRESS NOTE ADULT - NS ATTEND AMEND GEN_ALL_CORE FT
Orthopaedic Trauma Surgeon Addendum:    I have reviewed the physician assistant note and agree with the history, exam, and plan of care, except as noted.    Will plan on surgery tomorrow 12/19 for ORIF/I&D ankle, possibly ORIF BUE injuries    Vasile Patel MD  Orthopaedic Trauma Surgeon  [facility] [place]
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
## 2024-12-18 NOTE — ED PROCEDURE NOTE - ATTENDING CONTRIBUTION TO CARE
I have personally performed a history and physical examination of the patient and discussed management with the resident as well as the patient.  I reviewed the resident's note and agree with the documented findings and plan of care.  I have authored and modified critical sections of the Provider Note, including but not limited to HPI, Physical Exam and MDM.    Uncomplicated procedural sedation for fracture reduction of bilateral upper extremities and right lower extremity performed by orthopedics team.  Patient maintained normal oxygenation status and blood pressure throughout.  No aspiration event.  Patient woke from sedation without complication.
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
## 2024-12-18 NOTE — PROGRESS NOTE ADULT - SUBJECTIVE AND OBJECTIVE BOX
Post reduction films of the right distal radius show adequate alignment   Post splinting films of left ulnar shaft show fracture in stable positioning     FRACTURE REDUCTION  PROCEDURE NOTE: Fracture reduction   Performed by:  Susannah Odonnell RJ Lamberti   Indication: Acute fracture with displacement, requiring fracture reduction.  Consent: The risks and benefits of the procedure including incomplete reduction, nerve damage and bleeding were explained and the patient verbalized their understanding and wished to proceed with the procedure. Written consent was obtained following the discussion.  Universal Protocol: a time out was performed and the correct patient and site were verified   Procedure: Neurovascular exam intact prior to fracture reduction.  Skin exam : Distal fibula exposed through skin. Sub Q tissue present with bleeding. Anesthesia provided by ED department. Reduction of the right ankle fracture dislocation was accomplished via axial traction and careful manipulation. Following adequate reduction and alignment of the fractured bone, the fracture was immobilized with a  plaster splint. Distally, the extremity was neurovascular intact following the procedure.  The patient tolerated the procedure well.  Post reduction films obtained and demonstrated an adequate reduction.  Complications: None      Plan:   - Case discussed with Dr. Monte who reviewed imaging and gave plan regarding the LEFT ULNA and RIGHT DISTAL RADIUS   - Case discussed with Dr. Patel who reviewed imaging and gave plan regarding the RIGHT ANKLE FRACTURE DISLOCATION  - Care as per primary team   - Abx around the clock   - Patient to be NPO after midnight   - Patient to go to the OR with Dr. Patel tomorrow for right ankle fracture   - Keep bilateral UE splints and RLE clean, dry, and intact  - Keep RLE elevated to help with swelling
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
## 2024-12-18 NOTE — PROGRESS NOTE ADULT - SUBJECTIVE AND OBJECTIVE BOX
SPLINTING   PROCEDURE NOTE: Splinting  Performed by: Susannah Crawford PA-C   Indication: Fracture of left ulnar shaft  The left forearm was appropriately positioned. A plaster splint was applied. Distally, the extremity was neurovascular intact following the procedure. The patient tolerated the procedure well.
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
## 2024-12-18 NOTE — CONSULT NOTE ADULT - NS ATTEND AMEND GEN_ALL_CORE FT
Orthopaedic Trauma Surgeon Addendum:    I have reviewed the physician assistant note and agree with the history, exam, and plan of care, except as noted.    Orthopedic Surgery is ready to proceed with surgery pending medical optimization and adequate Operating Room availability. Risks of surgical delay discussed with other providers and staff. Will need to go to the OR semi-urgently for I&D/ORIF ankle and possibly ORIF bilateral upper extremities. Please call with any questions or concerns.     Vasile Patel MD  Orthopaedic Trauma Surgeon  [facility] [place]
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
## 2024-12-18 NOTE — ED PROVIDER NOTE - CARE PLAN
1 Principal Discharge DX:	Ankle fracture, right  Secondary Diagnosis:	Left [place] fracture  Secondary Diagnosis:	Distal radius fracture, right  Secondary Diagnosis:	Rib fracture
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
## 2024-12-18 NOTE — PATIENT PROFILE ADULT - FALL HARM RISK - RISK INTERVENTIONS

## 2024-12-19 ENCOUNTER — TRANSCRIPTION ENCOUNTER (OUTPATIENT)
Age: 54
End: 2024-12-19

## 2024-12-19 LAB
A1C WITH ESTIMATED AVERAGE GLUCOSE RESULT: 8.9 % — HIGH (ref 4–5.6)
ANION GAP SERPL CALC-SCNC: 14 MMOL/L — SIGNIFICANT CHANGE UP (ref 5–17)
APPEARANCE UR: CLEAR — SIGNIFICANT CHANGE UP
APPEARANCE UR: CLEAR — SIGNIFICANT CHANGE UP
BACTERIA # UR AUTO: ABNORMAL /HPF
BACTERIA # UR AUTO: ABNORMAL /HPF
BILIRUB UR-MCNC: NEGATIVE — SIGNIFICANT CHANGE UP
BILIRUB UR-MCNC: NEGATIVE — SIGNIFICANT CHANGE UP
BUN SERPL-MCNC: 17 MG/DL — SIGNIFICANT CHANGE UP (ref 8–20)
CALCIUM SERPL-MCNC: 9 MG/DL — SIGNIFICANT CHANGE UP (ref 8.4–10.5)
CAST: 0 /LPF — SIGNIFICANT CHANGE UP (ref 0–4)
CAST: 0 /LPF — SIGNIFICANT CHANGE UP (ref 0–4)
CHLORIDE SERPL-SCNC: 100 MMOL/L — SIGNIFICANT CHANGE UP (ref 96–108)
CO2 SERPL-SCNC: 22 MMOL/L — SIGNIFICANT CHANGE UP (ref 22–29)
COLOR SPEC: YELLOW — SIGNIFICANT CHANGE UP
COLOR SPEC: YELLOW — SIGNIFICANT CHANGE UP
CREAT SERPL-MCNC: 0.49 MG/DL — LOW (ref 0.5–1.3)
DIFF PNL FLD: NEGATIVE — SIGNIFICANT CHANGE UP
DIFF PNL FLD: NEGATIVE — SIGNIFICANT CHANGE UP
EGFR: 113 ML/MIN/1.73M2 — SIGNIFICANT CHANGE UP
ESTIMATED AVERAGE GLUCOSE: 209 MG/DL — HIGH (ref 68–114)
GLUCOSE BLDC GLUCOMTR-MCNC: 162 MG/DL — HIGH (ref 70–99)
GLUCOSE BLDC GLUCOMTR-MCNC: 189 MG/DL — HIGH (ref 70–99)
GLUCOSE BLDC GLUCOMTR-MCNC: 210 MG/DL — HIGH (ref 70–99)
GLUCOSE BLDC GLUCOMTR-MCNC: 220 MG/DL — HIGH (ref 70–99)
GLUCOSE BLDC GLUCOMTR-MCNC: 249 MG/DL — HIGH (ref 70–99)
GLUCOSE SERPL-MCNC: 186 MG/DL — HIGH (ref 70–99)
GLUCOSE UR QL: 500 MG/DL
GLUCOSE UR QL: 500 MG/DL
HCT VFR BLD CALC: 31.7 % — LOW (ref 34.5–45)
HGB BLD-MCNC: 10.5 G/DL — LOW (ref 11.5–15.5)
KETONES UR-MCNC: NEGATIVE MG/DL — SIGNIFICANT CHANGE UP
KETONES UR-MCNC: NEGATIVE MG/DL — SIGNIFICANT CHANGE UP
LEUKOCYTE ESTERASE UR-ACNC: NEGATIVE — SIGNIFICANT CHANGE UP
LEUKOCYTE ESTERASE UR-ACNC: NEGATIVE — SIGNIFICANT CHANGE UP
MAGNESIUM SERPL-MCNC: 1.8 MG/DL — SIGNIFICANT CHANGE UP (ref 1.6–2.6)
MCHC RBC-ENTMCNC: 28.8 PG — SIGNIFICANT CHANGE UP (ref 27–34)
MCHC RBC-ENTMCNC: 33.1 G/DL — SIGNIFICANT CHANGE UP (ref 32–36)
MCV RBC AUTO: 87.1 FL — SIGNIFICANT CHANGE UP (ref 80–100)
MRSA PCR RESULT.: SIGNIFICANT CHANGE UP
NITRITE UR-MCNC: POSITIVE
NITRITE UR-MCNC: POSITIVE
PH UR: 6.5 — SIGNIFICANT CHANGE UP (ref 5–8)
PH UR: 6.5 — SIGNIFICANT CHANGE UP (ref 5–8)
PHOSPHATE SERPL-MCNC: 4.6 MG/DL — SIGNIFICANT CHANGE UP (ref 2.4–4.7)
PLATELET # BLD AUTO: 171 K/UL — SIGNIFICANT CHANGE UP (ref 150–400)
POTASSIUM SERPL-MCNC: 4.2 MMOL/L — SIGNIFICANT CHANGE UP (ref 3.5–5.3)
POTASSIUM SERPL-SCNC: 4.2 MMOL/L — SIGNIFICANT CHANGE UP (ref 3.5–5.3)
PROT UR-MCNC: SIGNIFICANT CHANGE UP MG/DL
PROT UR-MCNC: SIGNIFICANT CHANGE UP MG/DL
RBC # BLD: 3.64 M/UL — LOW (ref 3.8–5.2)
RBC # FLD: 13.3 % — SIGNIFICANT CHANGE UP (ref 10.3–14.5)
RBC CASTS # UR COMP ASSIST: 0 /HPF — SIGNIFICANT CHANGE UP (ref 0–4)
RBC CASTS # UR COMP ASSIST: 1 /HPF — SIGNIFICANT CHANGE UP (ref 0–4)
S AUREUS DNA NOSE QL NAA+PROBE: SIGNIFICANT CHANGE UP
SODIUM SERPL-SCNC: 136 MMOL/L — SIGNIFICANT CHANGE UP (ref 135–145)
SP GR SPEC: >1.03 — HIGH (ref 1–1.03)
SP GR SPEC: >1.03 — HIGH (ref 1–1.03)
SQUAMOUS # UR AUTO: 3 /HPF — SIGNIFICANT CHANGE UP (ref 0–5)
SQUAMOUS # UR AUTO: 3 /HPF — SIGNIFICANT CHANGE UP (ref 0–5)
UROBILINOGEN FLD QL: 1 MG/DL — SIGNIFICANT CHANGE UP (ref 0.2–1)
UROBILINOGEN FLD QL: 1 MG/DL — SIGNIFICANT CHANGE UP (ref 0.2–1)
WBC # BLD: 6.5 K/UL — SIGNIFICANT CHANGE UP (ref 3.8–10.5)
WBC # FLD AUTO: 6.5 K/UL — SIGNIFICANT CHANGE UP (ref 3.8–10.5)
WBC UR QL: 1 /HPF — SIGNIFICANT CHANGE UP (ref 0–5)
WBC UR QL: 2 /HPF — SIGNIFICANT CHANGE UP (ref 0–5)

## 2024-12-19 PROCEDURE — 25608 OPTX DST RD XART FX/EPI SEP2: CPT | Mod: RT

## 2024-12-19 PROCEDURE — 27828 TREAT LOWER LEG FRACTURE: CPT | Mod: RT

## 2024-12-19 PROCEDURE — 11012 DEB SKIN BONE AT FX SITE: CPT | Mod: RT

## 2024-12-19 PROCEDURE — 25545 OPTX ULNAR SHFT FX INT FIXJ: CPT | Mod: LT

## 2024-12-19 PROCEDURE — 99232 SBSQ HOSP IP/OBS MODERATE 35: CPT

## 2024-12-19 PROCEDURE — 77071 MNL APPL STRS JT RADIOGRAPHY: CPT | Mod: RT

## 2024-12-19 DEVICE — IMPLANTABLE DEVICE: Type: IMPLANTABLE DEVICE | Site: RIGHT | Status: FUNCTIONAL

## 2024-12-19 DEVICE — SCREW CORT S-T 3.5X26MM: Type: IMPLANTABLE DEVICE | Site: RIGHT | Status: FUNCTIONAL

## 2024-12-19 DEVICE — PLATE 6 HOLE: Type: IMPLANTABLE DEVICE | Site: RIGHT | Status: FUNCTIONAL

## 2024-12-19 DEVICE — SCREW CORT S-T 3.5X16MM: Type: IMPLANTABLE DEVICE | Site: RIGHT | Status: FUNCTIONAL

## 2024-12-19 DEVICE — PLATE LCP 6H 3.5X85MM: Type: IMPLANTABLE DEVICE | Site: RIGHT | Status: FUNCTIONAL

## 2024-12-19 DEVICE — SCREW CORT S-T 3.5X14MM: Type: IMPLANTABLE DEVICE | Site: RIGHT | Status: FUNCTIONAL

## 2024-12-19 DEVICE — SCREW 2.4 VA 16: Type: IMPLANTABLE DEVICE | Site: RIGHT | Status: FUNCTIONAL

## 2024-12-19 DEVICE — SCREW CORTEX S-T 2.7X12MM: Type: IMPLANTABLE DEVICE | Site: RIGHT | Status: FUNCTIONAL

## 2024-12-19 DEVICE — SCREW CORT S-T 3.5X32MM: Type: IMPLANTABLE DEVICE | Site: RIGHT | Status: FUNCTIONAL

## 2024-12-19 DEVICE — SCREW 2.4 VA 18: Type: IMPLANTABLE DEVICE | Site: RIGHT | Status: FUNCTIONAL

## 2024-12-19 RX ORDER — MAGNESIUM SULFATE 500 MG/ML
2 INJECTION, SOLUTION INTRAMUSCULAR; INTRAVENOUS ONCE
Refills: 0 | Status: COMPLETED | OUTPATIENT
Start: 2024-12-19 | End: 2024-12-19

## 2024-12-19 RX ORDER — LEVOTHYROXINE SODIUM 175 UG/1
1 TABLET ORAL
Refills: 0 | DISCHARGE

## 2024-12-19 RX ORDER — ASPIRIN 81 MG
1 TABLET, DELAYED RELEASE (ENTERIC COATED) ORAL
Refills: 0 | DISCHARGE

## 2024-12-19 RX ORDER — ENOXAPARIN SODIUM 60 MG/.6ML
40 INJECTION INTRAVENOUS; SUBCUTANEOUS EVERY 24 HOURS
Refills: 0 | Status: DISCONTINUED | OUTPATIENT
Start: 2024-12-20 | End: 2025-01-10

## 2024-12-19 RX ORDER — LIDOCAINE 50 MG/G
2 OINTMENT TOPICAL EVERY 24 HOURS
Refills: 0 | Status: DISCONTINUED | OUTPATIENT
Start: 2024-12-19 | End: 2025-01-10

## 2024-12-19 RX ORDER — ACETAMINOPHEN 80 MG/.8ML
975 SOLUTION/ DROPS ORAL ONCE
Refills: 0 | Status: COMPLETED | OUTPATIENT
Start: 2024-12-19 | End: 2024-12-19

## 2024-12-19 RX ORDER — METFORMIN 850 MG/1
2 TABLET ORAL
Refills: 0 | DISCHARGE

## 2024-12-19 RX ORDER — FENTANYL 75 UG/H
25 PATCH, EXTENDED RELEASE TRANSDERMAL
Refills: 0 | Status: DISCONTINUED | OUTPATIENT
Start: 2024-12-19 | End: 2024-12-19

## 2024-12-19 RX ORDER — CEFTRIAXONE SODIUM 1 G/1
1000 INJECTION, POWDER, FOR SOLUTION INTRAMUSCULAR; INTRAVENOUS EVERY 24 HOURS
Refills: 0 | Status: COMPLETED | OUTPATIENT
Start: 2024-12-19 | End: 2024-12-21

## 2024-12-19 RX ORDER — GLIPIZIDE 10 MG
1 TABLET ORAL
Refills: 0 | DISCHARGE

## 2024-12-19 RX ORDER — CEFAZOLIN SODIUM 1 G
2000 VIAL (EA) INJECTION
Refills: 0 | Status: DISCONTINUED | OUTPATIENT
Start: 2024-12-19 | End: 2024-12-19

## 2024-12-19 RX ORDER — METHOCARBAMOL 500 MG
750 TABLET ORAL ONCE
Refills: 0 | Status: COMPLETED | OUTPATIENT
Start: 2024-12-19 | End: 2024-12-19

## 2024-12-19 RX ORDER — CYANOCOBALAMIN 1000 UG/ML
1 INJECTION, SOLUTION INTRAMUSCULAR; SUBCUTANEOUS
Refills: 0 | DISCHARGE

## 2024-12-19 RX ORDER — ACETAMINOPHEN 80 MG/.8ML
975 SOLUTION/ DROPS ORAL EVERY 6 HOURS
Refills: 0 | Status: DISCONTINUED | OUTPATIENT
Start: 2024-12-20 | End: 2025-01-10

## 2024-12-19 RX ORDER — KETOROLAC TROMETHAMINE 30 MG/ML
15 INJECTION INTRAMUSCULAR; INTRAVENOUS EVERY 6 HOURS
Refills: 0 | Status: DISCONTINUED | OUTPATIENT
Start: 2024-12-20 | End: 2024-12-25

## 2024-12-19 RX ORDER — ROSUVASTATIN 40 MG/1
1 TABLET, FILM COATED ORAL
Refills: 0 | DISCHARGE

## 2024-12-19 RX ORDER — METHOCARBAMOL 500 MG
750 TABLET ORAL EVERY 6 HOURS
Refills: 0 | Status: DISCONTINUED | OUTPATIENT
Start: 2024-12-20 | End: 2025-01-10

## 2024-12-19 RX ORDER — CEFAZOLIN SODIUM 1 G
2000 VIAL (EA) INJECTION ONCE
Refills: 0 | Status: DISCONTINUED | OUTPATIENT
Start: 2024-12-19 | End: 2024-12-19

## 2024-12-19 RX ORDER — KETOROLAC TROMETHAMINE 30 MG/ML
15 INJECTION INTRAMUSCULAR; INTRAVENOUS ONCE
Refills: 0 | Status: DISCONTINUED | OUTPATIENT
Start: 2024-12-19 | End: 2024-12-19

## 2024-12-19 RX ORDER — MIRTAZAPINE 7.5 MG/1
0.5 TABLET, FILM COATED ORAL
Refills: 0 | DISCHARGE

## 2024-12-19 RX ORDER — GABAPENTIN 300 MG/1
300 CAPSULE ORAL ONCE
Refills: 0 | Status: COMPLETED | OUTPATIENT
Start: 2024-12-19 | End: 2024-12-19

## 2024-12-19 RX ORDER — SODIUM CHLORIDE 9 MG/ML
1000 INJECTION, SOLUTION INTRAVENOUS
Refills: 0 | Status: DISCONTINUED | OUTPATIENT
Start: 2024-12-19 | End: 2024-12-19

## 2024-12-19 RX ORDER — MELOXICAM 15 MG
1 TABLET ORAL
Refills: 0 | DISCHARGE

## 2024-12-19 RX ORDER — GABAPENTIN 300 MG/1
300 CAPSULE ORAL EVERY 8 HOURS
Refills: 0 | Status: DISCONTINUED | OUTPATIENT
Start: 2024-12-20 | End: 2025-01-10

## 2024-12-19 RX ADMIN — GABAPENTIN 300 MILLIGRAM(S): 300 CAPSULE ORAL at 21:46

## 2024-12-19 RX ADMIN — KETOROLAC TROMETHAMINE 15 MILLIGRAM(S): 30 INJECTION INTRAMUSCULAR; INTRAVENOUS at 11:35

## 2024-12-19 RX ADMIN — Medication 750 MILLIGRAM(S): at 11:36

## 2024-12-19 RX ADMIN — ACETAMINOPHEN 1000 MILLIGRAM(S): 80 SOLUTION/ DROPS ORAL at 19:15

## 2024-12-19 RX ADMIN — Medication 10 MILLIGRAM(S): at 10:40

## 2024-12-19 RX ADMIN — Medication 10 MILLIGRAM(S): at 03:41

## 2024-12-19 RX ADMIN — ACETAMINOPHEN 400 MILLIGRAM(S): 80 SOLUTION/ DROPS ORAL at 18:45

## 2024-12-19 RX ADMIN — LEVOTHYROXINE SODIUM 100 MICROGRAM(S): 175 TABLET ORAL at 05:33

## 2024-12-19 RX ADMIN — Medication 750 MILLIGRAM(S): at 05:33

## 2024-12-19 RX ADMIN — Medication 2: at 08:49

## 2024-12-19 RX ADMIN — FENTANYL 25 MICROGRAM(S): 75 PATCH, EXTENDED RELEASE TRANSDERMAL at 19:30

## 2024-12-19 RX ADMIN — FENTANYL 25 MICROGRAM(S): 75 PATCH, EXTENDED RELEASE TRANSDERMAL at 19:45

## 2024-12-19 RX ADMIN — LIDOCAINE 1 PATCH: 50 OINTMENT TOPICAL at 08:16

## 2024-12-19 RX ADMIN — LIDOCAINE 2 PATCH: 50 OINTMENT TOPICAL at 23:51

## 2024-12-19 RX ADMIN — FENTANYL 25 MICROGRAM(S): 75 PATCH, EXTENDED RELEASE TRANSDERMAL at 19:15

## 2024-12-19 RX ADMIN — KETOROLAC TROMETHAMINE 15 MILLIGRAM(S): 30 INJECTION INTRAMUSCULAR; INTRAVENOUS at 19:45

## 2024-12-19 RX ADMIN — SODIUM CHLORIDE 100 MILLILITER(S): 9 INJECTION, SOLUTION INTRAVENOUS at 07:50

## 2024-12-19 RX ADMIN — FENTANYL 25 MICROGRAM(S): 75 PATCH, EXTENDED RELEASE TRANSDERMAL at 18:45

## 2024-12-19 RX ADMIN — ACETAMINOPHEN 400 MILLIGRAM(S): 80 SOLUTION/ DROPS ORAL at 05:32

## 2024-12-19 RX ADMIN — CEFTRIAXONE SODIUM 2000 MILLIGRAM(S): 1 INJECTION, POWDER, FOR SOLUTION INTRAMUSCULAR; INTRAVENOUS at 05:32

## 2024-12-19 RX ADMIN — KETOROLAC TROMETHAMINE 15 MILLIGRAM(S): 30 INJECTION INTRAMUSCULAR; INTRAVENOUS at 05:32

## 2024-12-19 RX ADMIN — MAGNESIUM SULFATE 25 GRAM(S): 500 INJECTION, SOLUTION INTRAMUSCULAR; INTRAVENOUS at 08:07

## 2024-12-19 RX ADMIN — CEFTRIAXONE SODIUM 1000 MILLIGRAM(S): 1 INJECTION, POWDER, FOR SOLUTION INTRAMUSCULAR; INTRAVENOUS at 21:57

## 2024-12-19 RX ADMIN — Medication 1 APPLICATION(S): at 11:36

## 2024-12-19 RX ADMIN — ACETAMINOPHEN 400 MILLIGRAM(S): 80 SOLUTION/ DROPS ORAL at 11:36

## 2024-12-19 RX ADMIN — POVIDONE IODINE USP, 10% W/W 1 APPLICATION(S): 10 SWAB TOPICAL at 13:54

## 2024-12-19 RX ADMIN — ACETAMINOPHEN 975 MILLIGRAM(S): 80 SOLUTION/ DROPS ORAL at 21:46

## 2024-12-19 RX ADMIN — KETOROLAC TROMETHAMINE 15 MILLIGRAM(S): 30 INJECTION INTRAMUSCULAR; INTRAVENOUS at 21:57

## 2024-12-19 RX ADMIN — KETOROLAC TROMETHAMINE 15 MILLIGRAM(S): 30 INJECTION INTRAMUSCULAR; INTRAVENOUS at 19:30

## 2024-12-19 RX ADMIN — FENTANYL 25 MICROGRAM(S): 75 PATCH, EXTENDED RELEASE TRANSDERMAL at 18:30

## 2024-12-19 RX ADMIN — KETOROLAC TROMETHAMINE 15 MILLIGRAM(S): 30 INJECTION INTRAMUSCULAR; INTRAVENOUS at 22:30

## 2024-12-19 RX ADMIN — FENTANYL 25 MICROGRAM(S): 75 PATCH, EXTENDED RELEASE TRANSDERMAL at 19:00

## 2024-12-19 RX ADMIN — Medication 4: at 12:39

## 2024-12-19 RX ADMIN — Medication 750 MILLIGRAM(S): at 21:46

## 2024-12-19 RX ADMIN — ACETAMINOPHEN 975 MILLIGRAM(S): 80 SOLUTION/ DROPS ORAL at 22:30

## 2024-12-19 NOTE — PROGRESS NOTE ADULT - SUBJECTIVE AND OBJECTIVE BOX
TRAUMA SURGERY PROGRESS NOTE      Subjective: Patient examined at bedside this AM. Reports she is in a great deal of pain still. That she feels like she may have be developing a UTI. NAEO      Objective:  Vital Signs  T(C): 36.5 (12-19 @ 13:36), Max: 37 (12-18 @ 22:58)  HR: 64 (12-19 @ 13:36) (56 - 72)  BP: 136/73 (12-19 @ 13:36) (97/51 - 149/78)  RR: 18 (12-19 @ 13:36) (16 - 20)  SpO2: 94% (12-19 @ 13:36) (93% - 100%)      Physical Exam:  General: alert and oriented, NAD  Resp: airway patent, respirations unlabored  CVS: regular rate and rhythm  Abdomen: soft, nontender, nondistended  Extremities: no edema, BUE wrapped in splints, RLE splinted  Skin: warm, dry, appropriate color      Labs:                        10.5   6.50  )-----------( 171      ( 19 Dec 2024 04:46 )             31.7   12-19    136  |  100  |  17.0  ----------------------------<  186[H]  4.2   |  22.0  |  0.49[L]    Ca    9.0      19 Dec 2024 04:46  Phos  4.6     12-19  Mg     1.8     12-19    TPro  7.2  /  Alb  4.6  /  TBili  0.8  /  DBili  x   /  AST  66[H]  /  ALT  66[H]  /  AlkPhos  90  12-18      CAPILLARY BLOOD GLUCOSE  POCT Blood Glucose.: 162 mg/dL (19 Dec 2024 13:32)  POCT Blood Glucose.: 210 mg/dL (19 Dec 2024 12:30)  POCT Blood Glucose.: 189 mg/dL (19 Dec 2024 08:34)  POCT Blood Glucose.: 187 mg/dL (18 Dec 2024 20:21)

## 2024-12-19 NOTE — DISCHARGE NOTE PROVIDER - NSDCFUADDAPPT_GEN_ALL_CORE_FT
Please schedule time to see your PCP within 4 weeks after discharge.     Please call to confirm or make appointments.    APPTS ARE READY TO BE MADE: [X] YES    Best Family or Patient Contact (if needed):    Additional Information about above appointments (if needed):    1: PCP appointment in 4 weeks        APPTS ARE READY TO BE MADE: [X] YES    Best Family or Patient Contact (if needed):    Additional Information about above appointments (if needed):    1: PCP appointment in 4 weeks     PCP:  Provider's office was contacted to secure an appointment, however the office will follow up with the patient/caregiver directly. Pt will see Dr Keating Via telehealth appt.    Orthopaedic Trauma:  Prior to outreaching the patient, it was visible that the patient has secured a follow up appointment which was not scheduled by our team.  HAZEL HANDY 1/23/2025 4:40 PM

## 2024-12-19 NOTE — PROGRESS NOTE ADULT - NS ATTEND AMEND GEN_ALL_CORE FT
I have seen and examined this patient with the surgery team on AM rounds.  No acute events overnight.  Patient appears well this morning.  Endorses pain in her right leg and arms.  No numbness/tingling.  Plan for operative intervention with orthopedic surgery today.

## 2024-12-19 NOTE — DISCHARGE NOTE PROVIDER - CARE PROVIDER_API CALL
Vaslie Patel  Orthopaedic Trauma  [address] [place] [address]  Phone: [phone]  Fax: [phone]  Follow Up Time:    Vasile Patel  Orthopaedic Trauma  [address] [place] [address]  Phone: [phone]  Fax: [phone]  Follow Up Time: 2 weeks

## 2024-12-19 NOTE — BRIEF OPERATIVE NOTE - COMMENTS
post-op plan: WBAT LLE, WB through elbows BUE, WB through knee RLE but NWB ankle, PT/OT, ancef per protocol, contralateral SCD, LMWH (or equivalent) x 4 weeks post-op
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
## 2024-12-19 NOTE — DISCHARGE NOTE PROVIDER - NSDCCPCAREPLAN_GEN_ALL_CORE_FT
PRINCIPAL DISCHARGE DIAGNOSIS  Diagnosis: Ankle fracture, right  Assessment and Plan of Treatment: Ortho: Patient underwent a wash out/ORIF of the right ankle, ORIF fo the right wrist, and ORIF of the left ulna on 12/19/24  The patient will be seen in the office between 2-3 weeks wound check. PLEASE CALL OFFICE TO CONFIRM APPOINTMENT  - Sutures/Staples/Splint will be removed at that time.   - The patient will continue with splints as applied in hospital and not remove until re-evaluation in the office.  - The patient will contact the office if the wound becomes red, has increasing pain, develops bleeding or discharge, an injury occurs, or has other concerns.   - The patient will continue LOVENOX for 4 weeks for DVTP  - The patient will take pain medication for pain control and titrate according to prescription and patient needs.   - The patient is weight bearing through the elbow of the bilateral upper extremities and weight bearing through the knee of the right lower extremity   - The patient is recommended to elevated the affected extremity to reduce swelling. If the splint/cast  becomes too tight, the patient is to immediately elevate and contact the office to discuss further management or immediately proceed to the office if unable to make contact with the office.      SECONDARY DISCHARGE DIAGNOSES  Diagnosis: Left Monteggia fracture  Assessment and Plan of Treatment:     Diagnosis: Distal radius fracture, right  Assessment and Plan of Treatment:     Diagnosis: Rib fracture  Assessment and Plan of Treatment:      PRINCIPAL DISCHARGE DIAGNOSIS  Diagnosis: Ankle fracture, right  Assessment and Plan of Treatment: Ortho: Patient underwent a wash out/ORIF of the right ankle, ORIF fo the right wrist, and ORIF of the left ulna on 12/19/24  The patient will be seen in the office between 2-3 weeks for follo and w-up wound check. PLEASE CALL OFFICE TO CONFIRM APPOINTMENT  - Sutures/Staples/Splint may be removed at that time is still present.  - The patient will contact the ortho or trauma office if the wound becomes red, has increasing pain, develops bleeding or discharge, an injury occurs, or has other concerns.   - The patient will continue LOVENOX for 4 weeks for DVTP  - The patient will take pain medication for pain control and titrate according to prescription and patient needs.   - The patient is weight-bearing as tolerated of left upper extremity, weight-bearing through elbow of right upper extremity, weight-bearing as tolerated of left lower extremity, and toe-touch weight-bearing with CAM boot in place of right lower extremity. Patient may remove CAM boot while in bed or resting.  - The patient is recommended to elevated the affected extremity to reduce swelling.      SECONDARY DISCHARGE DIAGNOSES  Diagnosis: Left Monteggia fracture  Assessment and Plan of Treatment:     Diagnosis: Distal radius fracture, right  Assessment and Plan of Treatment:     Diagnosis: Rib fracture  Assessment and Plan of Treatment:
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
## 2024-12-19 NOTE — BRIEF OPERATIVE NOTE - NSICDXBRIEFPROCEDURE_GEN_ALL_CORE_FT
PROCEDURES:  Open reduction and internal fixation (ORIF) of pilon fracture of right tibia 19-Dec-2024 17:48:11  Vasile Patel  Open reduction and internal fixation of fracture of right distal radius and ulna 19-Dec-2024 17:48:52  Vasile Patel  Open treatment, fracture, ulna, shaft 19-Dec-2024 17:50:01  Vasile Patel

## 2024-12-19 NOTE — PROGRESS NOTE ADULT - SUBJECTIVE AND OBJECTIVE BOX
ORTHO-POST-OP PROGRESS NOTE:  [id number]  YUNI HOLLOWAY  PROCEDURE: ORIF and wash out of right ankle. ORIF of right DR. ORIF of left ulna   DOS: 12/20/24    SUBJECTIVE: 54y Patient seen and examined. Patient reports of moderate discomfort that is controlled by medications. Patient denies of acute sensory or motor changes.                           10.5   6.50  )-----------( 171      ( 19 Dec 2024 04:46 )             31.7     acetaminophen     Tablet .. 975 milliGRAM(s) Oral every 6 hours  cefTRIAXone Injectable. 1000 milliGRAM(s) IV Push every 24 hours  enoxaparin Injectable 40 milliGRAM(s) SubCutaneous every 24 hours  gabapentin 300 milliGRAM(s) Oral every 8 hours  influenza   Vaccine 0.5 milliLiter(s) IntraMuscular once  ketorolac   Injectable 15 milliGRAM(s) IV Push every 6 hours  lactated ringers. 1000 milliLiter(s) IV Continuous <Continuous>  lidocaine   4% Patch 2 Patch Transdermal every 24 hours  methocarbamol 750 milliGRAM(s) Oral every 6 hours  vancomycin  IVPB 1250 milliGRAM(s) IV Intermittent once    T(C): 36.9 (12-20-24 @ 00:37), Max: 37.1 (12-19-24 @ 20:37)  HR: 67 (12-20-24 @ 00:37) (58 - 67)  BP: 165/70 (12-20-24 @ 00:37) (105/61 - 170/85)  RR: 18 (12-20-24 @ 00:37) (13 - 20)  SpO2: 95% (12-20-24 @ 00:37) (93% - 100%)  Wt(kg): --    PHYSICAL EXAM:   Constitutional: Alert, responsive, in no acute distress.   Injured Extremity: RUE, LUE, RLE        Splint/Dressings are all Clean/dry/intact.        Sensation: normal to light touch to bilateral fingers and RLE toes.         Motor exam: Patient flexing and extending toes of RLE. Patient with full ROM of fingers to BL upper extremities.                                                 Vascular:  + warm well perfused; capillary refill <3 seconds                                               A/P : 55 yo Female S/P ORIF of right ankle, right wrist, left ulna POD#0   -  Care as per primary team   -  Pain control  -  DVT ppx Pharmacolgic    -  PT in AM  -  Weight bearing status: weight bearing through bilateral elbows. Weight bearing through knee of RLE

## 2024-12-19 NOTE — DISCHARGE NOTE PROVIDER - NSDCFUADDINST_GEN_ALL_CORE_FT
Ortho: Patient underwent a wash out/ORIF of the right ankle, ORIF fo the right wrist, and ORIF of the left ulna on 12/19/24  The patient will be seen in the office between 2-3 weeks wound check. PLEASE CALL OFFICE TO CONFIRM APPOINTMENT  - Sutures/Staples/Splint will be removed at that time.   - The patient will continue with splints as applied in hospital and not remove until re-evaluation in the office.  - The patient will contact the office if the wound becomes red, has increasing pain, develops bleeding or discharge, an injury occurs, or has other concerns.   - The patient will continue LOVENOX for 4 weeks for DVTP  - The patient will take pain medication for pain control and titrate according to prescription and patient needs.   - The patient is weight bearing through the elbow of the bilateral upper extremities and weight bearing through the knee of the right lower extremity   - The patient is recommended to elevated the affected extremity to reduce swelling. If the splint/cast  becomes too tight, the patient is to immediately elevate and contact the office to discuss further management or immediately proceed to the office if unable to make contact with the office. Ortho: Patient underwent a wash out/ORIF of the right ankle, ORIF fo the right wrist, and ORIF of the left ulna on 12/19/24  The patient will be seen in the office between 2-3 weeks wound check. PLEASE CALL OFFICE TO CONFIRM APPOINTMENT  - The patient will contact the office if the wound becomes red, has increasing pain, develops bleeding or discharge, an injury occurs, or has other concerns.   - The patient will continue LOVENOX for 4 weeks for DVTP  - The patient will take pain medication for pain control and titrate according to prescription and patient needs.   - The patient is weight bearing through the elbow of the RIGHT upper extremity, WBAT left upper extremity and toe touch weight bearing of the right lower extremity in cam boot   - The patient is recommended to elevated the affected extremity to reduce swelling. If the splint/cast  becomes too tight, the patient is to immediately elevate and contact the office to discuss further management or immediately proceed to the office if unable to make contact with the office. Ortho: Patient underwent a wash out/ORIF of the right ankle, ORIF fo the right wrist, and ORIF of the left ulna on 12/19/24  The patient will be seen in the office between 2-3 weeks wound check. PLEASE CALL OFFICE TO CONFIRM APPOINTMENT  - The patient will contact the office if the wound becomes red, has increasing pain, develops bleeding or discharge, an injury occurs, or has other concerns.   - The patient will continue LOVENOX for 4 weeks for DVTP  - The patient will take pain medication for pain control and titrate according to prescription and patient needs.   - The patient is weight bearing through the elbow of the RIGHT upper extremity, WBAT left upper extremity and lower extremity, and toe touch weight bearing of the right lower extremity in CAM boot.   - The patient is recommended to elevated the affected extremity to reduce swelling. If the splint/cast  becomes too tight, the patient is to immediately elevate and contact the office to discuss further management or immediately proceed to the office if unable to make contact with the office.

## 2024-12-19 NOTE — DISCHARGE NOTE PROVIDER - NSDCMRMEDTOKEN_GEN_ALL_CORE_FT
aspirin 81 mg oral delayed release tablet: 1 tab(s) orally once a day  cyanocobalamin 1000 mcg oral tablet: 1 tab(s) orally every other day  glipiZIDE 5 mg oral tablet, extended release: 1 tab(s) orally once a day  levothyroxine 100 mcg (0.1 mg) oral tablet: 1 tab(s) orally once a day except Sundays  meloxicam 15 mg oral tablet: 1 tab(s) orally once a day as needed for pain  MetFORMIN (Eqv-Fortamet) 500 mg oral tablet, extended release: 2 tab(s) orally 2 times a day  mirtazapine 15 mg oral tablet: 0.5 tab(s) orally once a day (at bedtime)  Omega-3 1000 mg oral capsule: 1 cap(s) orally once a day  rosuvastatin 10 mg oral tablet: 1 tab(s) orally once a day   acetaminophen 325 mg oral tablet: 3 tab(s) orally every 6 hours  aspirin 81 mg oral delayed release tablet: 1 tab(s) orally once a day  cyanocobalamin 1000 mcg oral tablet: 1 tab(s) orally every other day  enoxaparin: 40 milligram(s) subcutaneous once a day  gabapentin 300 mg oral capsule: 1 cap(s) orally every 8 hours  glipiZIDE 5 mg oral tablet, extended release: 1 tab(s) orally once a day  levothyroxine 100 mcg (0.1 mg) oral tablet: 1 tab(s) orally once a day except Sundays  lidocaine 4% topical film: Apply topically to affected area once a day  meloxicam 15 mg oral tablet: 1 tab(s) orally once a day as needed for pain  MetFORMIN (Eqv-Fortamet) 500 mg oral tablet, extended release: 2 tab(s) orally 2 times a day  methocarbamol 750 mg oral tablet: 1 tab(s) orally every 6 hours  mirtazapine 15 mg oral tablet: 0.5 tab(s) orally once a day (at bedtime)  Omega-3 1000 mg oral capsule: 1 cap(s) orally once a day  polyethylene glycol 3350 oral powder for reconstitution: 17 gram(s) orally once a day  rosuvastatin 10 mg oral tablet: 1 tab(s) orally once a day   acetaminophen 325 mg oral tablet: 3 tab(s) orally every 6 hours  aspirin 81 mg oral delayed release tablet: 1 tab(s) orally once a day  cyanocobalamin 1000 mcg oral tablet: 1 tab(s) orally every other day  enoxaparin 40 mg/0.4 mL injectable solution: 40 milligram(s) subcutaneously once a day To complete 4 weeks of prophylaxis from date of surgery, 12/19/24. To end on 1/17/25 MDD: 1  glipiZIDE 5 mg oral tablet, extended release: 1 tab(s) orally once a day  levothyroxine 100 mcg (0.1 mg) oral tablet: 1 tab(s) orally once a day except Sundays  meloxicam 15 mg oral tablet: 1 tab(s) orally once a day as needed for pain  MetFORMIN (Eqv-Fortamet) 500 mg oral tablet, extended release: 2 tab(s) orally 2 times a day  methocarbamol 500 mg oral tablet: 1 tab(s) orally every 8 hours as needed for  moderate pain MDD: 3  mirtazapine 15 mg oral tablet: 0.5 tab(s) orally once a day (at bedtime)  Neurontin 300 mg oral capsule: 1 cap(s) orally every 8 hours as needed for  moderate pain MDD: 3  Omega-3 1000 mg oral capsule: 1 cap(s) orally once a day  rosuvastatin 10 mg oral tablet: 1 tab(s) orally once a day

## 2024-12-19 NOTE — BRIEF OPERATIVE NOTE - NSICDXBRIEFPREOP_GEN_ALL_CORE_FT
PRE-OP DIAGNOSIS:  Open displaced pilon fracture of right tibia 19-Dec-2024 17:50:33  Vasile Patel  Distal radius fracture, right 19-Dec-2024 17:50:43  Vasile Patel  Fracture of left ulna, shaft 19-Dec-2024 17:50:52  Vasile Patel

## 2024-12-19 NOTE — CHART NOTE - NSCHARTNOTEFT_GEN_A_CORE
Post-op Check    Subjective:  Pt offers no acute complaints at this time. Pain well controlled on current regiment will avoid nacrotics as per patient request. Denies chest pain, SOB, palpitations. All extremities NVI, splints well seated     STATUS POST:  Open reduction and internal fixation (ORIF) of pilon fracture of right tibia, Open reduction and internal fixation of fracture of right distal radius and ulna, Open treatment, fracture, ulna, shaft     POST OPERATIVE DAY #: 0    MEDICATIONS  (STANDING):  acetaminophen     Tablet .. 975 milliGRAM(s) Oral once  cefTRIAXone Injectable. 1000 milliGRAM(s) IV Push every 24 hours  gabapentin 300 milliGRAM(s) Oral once  influenza   Vaccine 0.5 milliLiter(s) IntraMuscular once  ketorolac   Injectable 15 milliGRAM(s) IV Push once  lactated ringers. 1000 milliLiter(s) (100 mL/Hr) IV Continuous <Continuous>  methocarbamol 750 milliGRAM(s) Oral once  vancomycin  IVPB 1250 milliGRAM(s) IV Intermittent once    MEDICATIONS  (PRN):      Vital Signs Last 24 Hrs  T(C): 37.1 (19 Dec 2024 20:37), Max: 37.1 (19 Dec 2024 20:37)  T(F): 98.7 (19 Dec 2024 20:37), Max: 98.7 (19 Dec 2024 20:37)  HR: 67 (19 Dec 2024 20:37) (58 - 67)  BP: 138/81 (19 Dec 2024 20:37) (105/61 - 170/85)  BP(mean): 105 (19 Dec 2024 19:00) (76 - 115)  RR: 18 (19 Dec 2024 20:37) (13 - 20)  SpO2: 93% (19 Dec 2024 20:37) (93% - 100%)    Parameters below as of 19 Dec 2024 20:37  Patient On (Oxygen Delivery Method): room air        Physical Exam:    Constitutional: in good spirit s  HEENT: PERRL, EOMI  Neck: No JVD, FROM without pain  Respiratory: no accessory muscle use, respirations non-labored  GI:   Neurological: A&O x 3; without gross deficit    A:     P:  Continue current care  Pain control  OOB as tolerated  Encourage IS  DVT ppx Post-op Check    Subjective:  Pt offers no acute complaints at this time. Pain well controlled on current regiment will avoid nacrotics as per patient request. Denies chest pain, SOB, palpitations. All extremities NVI, splints well seated     STATUS POST:  Open reduction and internal fixation (ORIF) of pilon fracture of right tibia, Open reduction and internal fixation of fracture of right distal radius and ulna, Open treatment, fracture, ulna, shaft     POST OPERATIVE DAY #: 0    MEDICATIONS  (STANDING):  acetaminophen     Tablet .. 975 milliGRAM(s) Oral once  cefTRIAXone Injectable. 1000 milliGRAM(s) IV Push every 24 hours  gabapentin 300 milliGRAM(s) Oral once  influenza   Vaccine 0.5 milliLiter(s) IntraMuscular once  ketorolac   Injectable 15 milliGRAM(s) IV Push once  lactated ringers. 1000 milliLiter(s) (100 mL/Hr) IV Continuous <Continuous>  methocarbamol 750 milliGRAM(s) Oral once  vancomycin  IVPB 1250 milliGRAM(s) IV Intermittent once    MEDICATIONS  (PRN):      Vital Signs Last 24 Hrs  T(C): 37.1 (19 Dec 2024 20:37), Max: 37.1 (19 Dec 2024 20:37)  T(F): 98.7 (19 Dec 2024 20:37), Max: 98.7 (19 Dec 2024 20:37)  HR: 67 (19 Dec 2024 20:37) (58 - 67)  BP: 138/81 (19 Dec 2024 20:37) (105/61 - 170/85)  BP(mean): 105 (19 Dec 2024 19:00) (76 - 115)  RR: 18 (19 Dec 2024 20:37) (13 - 20)  SpO2: 93% (19 Dec 2024 20:37) (93% - 100%)    Parameters below as of 19 Dec 2024 20:37  Patient On (Oxygen Delivery Method): room air        Physical Exam:    Constitutional: in good spirit s  HEENT: PERRL, EOMI  Neck: No JVD, FROM without pain  Respiratory: no accessory muscle use, respirations non-labored  Ext: B/L UE nvi/ NWB/ splints well seated .. RLE NVI, able to move toes/ NWB  Neurological: A&O x 3; without gross deficit    A:     P:  neurovascular checks  NWB RLE/ B/L UE  Pain control  OOB as tolerated  Encourage IS  DVT ppx  PT/OT  ice/elevation

## 2024-12-20 LAB
ANION GAP SERPL CALC-SCNC: 10 MMOL/L — SIGNIFICANT CHANGE UP (ref 5–17)
APPEARANCE UR: CLEAR — SIGNIFICANT CHANGE UP
BASOPHILS # BLD AUTO: 0.02 K/UL — SIGNIFICANT CHANGE UP (ref 0–0.2)
BASOPHILS NFR BLD AUTO: 0.3 % — SIGNIFICANT CHANGE UP (ref 0–2)
BILIRUB UR-MCNC: NEGATIVE — SIGNIFICANT CHANGE UP
BUN SERPL-MCNC: 14.8 MG/DL — SIGNIFICANT CHANGE UP (ref 8–20)
CALCIUM SERPL-MCNC: 8.3 MG/DL — LOW (ref 8.4–10.5)
CHLORIDE SERPL-SCNC: 101 MMOL/L — SIGNIFICANT CHANGE UP (ref 96–108)
CO2 SERPL-SCNC: 23 MMOL/L — SIGNIFICANT CHANGE UP (ref 22–29)
COLOR SPEC: YELLOW — SIGNIFICANT CHANGE UP
CREAT SERPL-MCNC: 0.46 MG/DL — LOW (ref 0.5–1.3)
DIFF PNL FLD: NEGATIVE — SIGNIFICANT CHANGE UP
EGFR: 115 ML/MIN/1.73M2 — SIGNIFICANT CHANGE UP
EOSINOPHIL # BLD AUTO: 0.01 K/UL — SIGNIFICANT CHANGE UP (ref 0–0.5)
EOSINOPHIL NFR BLD AUTO: 0.2 % — SIGNIFICANT CHANGE UP (ref 0–6)
GLUCOSE BLDC GLUCOMTR-MCNC: 180 MG/DL — HIGH (ref 70–99)
GLUCOSE BLDC GLUCOMTR-MCNC: 196 MG/DL — HIGH (ref 70–99)
GLUCOSE BLDC GLUCOMTR-MCNC: 223 MG/DL — HIGH (ref 70–99)
GLUCOSE BLDC GLUCOMTR-MCNC: 242 MG/DL — HIGH (ref 70–99)
GLUCOSE SERPL-MCNC: 220 MG/DL — HIGH (ref 70–99)
GLUCOSE UR QL: >=1000 MG/DL
HCT VFR BLD CALC: 29.5 % — LOW (ref 34.5–45)
HGB BLD-MCNC: 9.8 G/DL — LOW (ref 11.5–15.5)
IMM GRANULOCYTES NFR BLD AUTO: 0.8 % — SIGNIFICANT CHANGE UP (ref 0–0.9)
KETONES UR-MCNC: ABNORMAL MG/DL
LEUKOCYTE ESTERASE UR-ACNC: NEGATIVE — SIGNIFICANT CHANGE UP
LYMPHOCYTES # BLD AUTO: 1.39 K/UL — SIGNIFICANT CHANGE UP (ref 1–3.3)
LYMPHOCYTES # BLD AUTO: 21.3 % — SIGNIFICANT CHANGE UP (ref 13–44)
MAGNESIUM SERPL-MCNC: 2.2 MG/DL — SIGNIFICANT CHANGE UP (ref 1.8–2.6)
MCHC RBC-ENTMCNC: 29.3 PG — SIGNIFICANT CHANGE UP (ref 27–34)
MCHC RBC-ENTMCNC: 33.2 G/DL — SIGNIFICANT CHANGE UP (ref 32–36)
MCV RBC AUTO: 88.1 FL — SIGNIFICANT CHANGE UP (ref 80–100)
MONOCYTES # BLD AUTO: 0.82 K/UL — SIGNIFICANT CHANGE UP (ref 0–0.9)
MONOCYTES NFR BLD AUTO: 12.6 % — SIGNIFICANT CHANGE UP (ref 2–14)
NEUTROPHILS # BLD AUTO: 4.23 K/UL — SIGNIFICANT CHANGE UP (ref 1.8–7.4)
NEUTROPHILS NFR BLD AUTO: 64.8 % — SIGNIFICANT CHANGE UP (ref 43–77)
NITRITE UR-MCNC: NEGATIVE — SIGNIFICANT CHANGE UP
PH UR: 6.5 — SIGNIFICANT CHANGE UP (ref 5–8)
PHOSPHATE SERPL-MCNC: 2.8 MG/DL — SIGNIFICANT CHANGE UP (ref 2.4–4.7)
PLATELET # BLD AUTO: 159 K/UL — SIGNIFICANT CHANGE UP (ref 150–400)
POTASSIUM SERPL-MCNC: 4 MMOL/L — SIGNIFICANT CHANGE UP (ref 3.5–5.3)
POTASSIUM SERPL-SCNC: 4 MMOL/L — SIGNIFICANT CHANGE UP (ref 3.5–5.3)
PROT UR-MCNC: NEGATIVE MG/DL — SIGNIFICANT CHANGE UP
RBC # BLD: 3.35 M/UL — LOW (ref 3.8–5.2)
RBC # FLD: 13.3 % — SIGNIFICANT CHANGE UP (ref 10.3–14.5)
SODIUM SERPL-SCNC: 134 MMOL/L — LOW (ref 135–145)
SP GR SPEC: 1.02 — SIGNIFICANT CHANGE UP (ref 1–1.03)
UROBILINOGEN FLD QL: 0.2 MG/DL — SIGNIFICANT CHANGE UP (ref 0.2–1)
WBC # BLD: 6.52 K/UL — SIGNIFICANT CHANGE UP (ref 3.8–10.5)
WBC # FLD AUTO: 6.52 K/UL — SIGNIFICANT CHANGE UP (ref 3.8–10.5)

## 2024-12-20 PROCEDURE — 99232 SBSQ HOSP IP/OBS MODERATE 35: CPT

## 2024-12-20 RX ORDER — SODIUM CHLORIDE 9 MG/ML
1000 INJECTION, SOLUTION INTRAVENOUS
Refills: 0 | Status: DISCONTINUED | OUTPATIENT
Start: 2024-12-20 | End: 2024-12-25

## 2024-12-20 RX ORDER — DEXTROSE MONOHYDRATE 25 G/50ML
25 INJECTION, SOLUTION INTRAVENOUS ONCE
Refills: 0 | Status: DISCONTINUED | OUTPATIENT
Start: 2024-12-20 | End: 2024-12-25

## 2024-12-20 RX ORDER — INSULIN LISPRO 100/ML
VIAL (ML) SUBCUTANEOUS
Refills: 0 | Status: DISCONTINUED | OUTPATIENT
Start: 2024-12-20 | End: 2024-12-25

## 2024-12-20 RX ORDER — KETOROLAC TROMETHAMINE 30 MG/ML
15 INJECTION INTRAMUSCULAR; INTRAVENOUS ONCE
Refills: 0 | Status: DISCONTINUED | OUTPATIENT
Start: 2024-12-20 | End: 2024-12-20

## 2024-12-20 RX ORDER — GLUCAGON INJECTION, SOLUTION 0.5 MG/.1ML
1 INJECTION, SOLUTION SUBCUTANEOUS ONCE
Refills: 0 | Status: DISCONTINUED | OUTPATIENT
Start: 2024-12-20 | End: 2024-12-25

## 2024-12-20 RX ORDER — DEXTROSE MONOHYDRATE 25 G/50ML
15 INJECTION, SOLUTION INTRAVENOUS ONCE
Refills: 0 | Status: DISCONTINUED | OUTPATIENT
Start: 2024-12-20 | End: 2024-12-25

## 2024-12-20 RX ORDER — DEXTROSE MONOHYDRATE 25 G/50ML
12.5 INJECTION, SOLUTION INTRAVENOUS ONCE
Refills: 0 | Status: DISCONTINUED | OUTPATIENT
Start: 2024-12-20 | End: 2024-12-25

## 2024-12-20 RX ADMIN — ACETAMINOPHEN 975 MILLIGRAM(S): 80 SOLUTION/ DROPS ORAL at 03:19

## 2024-12-20 RX ADMIN — ACETAMINOPHEN 975 MILLIGRAM(S): 80 SOLUTION/ DROPS ORAL at 08:22

## 2024-12-20 RX ADMIN — Medication 1: at 17:42

## 2024-12-20 RX ADMIN — LIDOCAINE 2 PATCH: 50 OINTMENT TOPICAL at 11:00

## 2024-12-20 RX ADMIN — Medication 750 MILLIGRAM(S): at 03:19

## 2024-12-20 RX ADMIN — GABAPENTIN 300 MILLIGRAM(S): 300 CAPSULE ORAL at 05:04

## 2024-12-20 RX ADMIN — KETOROLAC TROMETHAMINE 15 MILLIGRAM(S): 30 INJECTION INTRAMUSCULAR; INTRAVENOUS at 18:42

## 2024-12-20 RX ADMIN — Medication 750 MILLIGRAM(S): at 21:00

## 2024-12-20 RX ADMIN — KETOROLAC TROMETHAMINE 15 MILLIGRAM(S): 30 INJECTION INTRAMUSCULAR; INTRAVENOUS at 21:01

## 2024-12-20 RX ADMIN — ACETAMINOPHEN 975 MILLIGRAM(S): 80 SOLUTION/ DROPS ORAL at 09:30

## 2024-12-20 RX ADMIN — KETOROLAC TROMETHAMINE 15 MILLIGRAM(S): 30 INJECTION INTRAMUSCULAR; INTRAVENOUS at 03:19

## 2024-12-20 RX ADMIN — GABAPENTIN 300 MILLIGRAM(S): 300 CAPSULE ORAL at 21:00

## 2024-12-20 RX ADMIN — Medication 750 MILLIGRAM(S): at 08:22

## 2024-12-20 RX ADMIN — KETOROLAC TROMETHAMINE 15 MILLIGRAM(S): 30 INJECTION INTRAMUSCULAR; INTRAVENOUS at 14:14

## 2024-12-20 RX ADMIN — ACETAMINOPHEN 975 MILLIGRAM(S): 80 SOLUTION/ DROPS ORAL at 14:14

## 2024-12-20 RX ADMIN — LIDOCAINE 2 PATCH: 50 OINTMENT TOPICAL at 21:00

## 2024-12-20 RX ADMIN — Medication 750 MILLIGRAM(S): at 14:14

## 2024-12-20 RX ADMIN — GABAPENTIN 300 MILLIGRAM(S): 300 CAPSULE ORAL at 14:13

## 2024-12-20 RX ADMIN — CEFTRIAXONE SODIUM 1000 MILLIGRAM(S): 1 INJECTION, POWDER, FOR SOLUTION INTRAMUSCULAR; INTRAVENOUS at 21:00

## 2024-12-20 RX ADMIN — ENOXAPARIN SODIUM 40 MILLIGRAM(S): 60 INJECTION INTRAVENOUS; SUBCUTANEOUS at 05:04

## 2024-12-20 RX ADMIN — KETOROLAC TROMETHAMINE 15 MILLIGRAM(S): 30 INJECTION INTRAMUSCULAR; INTRAVENOUS at 09:30

## 2024-12-20 RX ADMIN — ACETAMINOPHEN 975 MILLIGRAM(S): 80 SOLUTION/ DROPS ORAL at 15:30

## 2024-12-20 RX ADMIN — KETOROLAC TROMETHAMINE 15 MILLIGRAM(S): 30 INJECTION INTRAMUSCULAR; INTRAVENOUS at 08:22

## 2024-12-20 RX ADMIN — ACETAMINOPHEN 975 MILLIGRAM(S): 80 SOLUTION/ DROPS ORAL at 21:00

## 2024-12-20 RX ADMIN — KETOROLAC TROMETHAMINE 15 MILLIGRAM(S): 30 INJECTION INTRAMUSCULAR; INTRAVENOUS at 22:01

## 2024-12-20 RX ADMIN — KETOROLAC TROMETHAMINE 15 MILLIGRAM(S): 30 INJECTION INTRAMUSCULAR; INTRAVENOUS at 16:30

## 2024-12-20 RX ADMIN — Medication 1: at 12:01

## 2024-12-20 RX ADMIN — ACETAMINOPHEN 975 MILLIGRAM(S): 80 SOLUTION/ DROPS ORAL at 22:00

## 2024-12-20 NOTE — PROGRESS NOTE ADULT - SUBJECTIVE AND OBJECTIVE BOX
Ortho Progress note    Name: YUNI HOLLOWAY    MR #: [id number]    Procedure: right ankle ORIF/ right distal radius ORIF/ left ulna ORIF  Surgeon: Dr. Patel    Pt comfortable without complaints, pain controlled  Denies CP, SOB, N/V, numbness/tingling     General Exam:  Vital Signs Last 24 Hrs  T(C): 37.2 (12-20-24 @ 04:46), Max: 37.2 (12-20-24 @ 04:46)  T(F): 98.9 (12-20-24 @ 04:46), Max: 98.9 (12-20-24 @ 04:46)  HR: 73 (12-20-24 @ 04:46) (73 - 73)  BP: 154/74 (12-20-24 @ 04:46) (154/74 - 154/74)  BP(mean): --  RR: 18 (12-20-24 @ 04:46) (18 - 18)  SpO2: 93% (12-20-24 @ 04:46) (93% - 93%)    General: Pt Alert and oriented, NAD, controlled pain.    RUE Splint/ dressing C/D/I.   Moving fingers well  Sensation intact to light touch    LUE Dressing C/D/I  moving wrist and fingers well  2+ radial pulse  sensation intact to light touch     RLE splint/dressing C/D/I  Pulses: 2+ dorsalis pedis pulse. Cap refill < 2 sec.  Sensation: Grossly intact to light touch without deficit.  Motor: + EHL/FHL    A/P: 54yFemale POD#1 s/p right ankle ORIF/ right distal radius ORIF/ left ulna ORIF    - Pain Control  - DVT ppx: lovenox 40QD  - PT eval pending  - Weight bearing status: may weight bear through elbows bilateral upper extremities  weight bearing as tolerated through knee right lower extremity  - Continue care per primary team  - DC planning

## 2024-12-20 NOTE — PROGRESS NOTE ADULT - NS ATTEND AMEND GEN_ALL_CORE FT
I have seen and examined this patient with the surgery team on AM rounds.  No acute events overnight.  Patient appears well this morning.  Pain improved in the arms and leg.   S/p ORIF RLE and B/L UEs.  On exam, pulses are intact.  No numbness/tingling.  PT/Rehab consultation.  OT consultation.

## 2024-12-20 NOTE — CHART NOTE - NSCHARTNOTEFT_GEN_A_CORE
Tertiary Trauma Survey (TTS)    Date of TTS: 12-20-24 @ 14:44                             Admit Date: 12-18-24 @ 18:23      Trauma Activation:    List Injuries Identified to Date:  - Nightstick fracture of the left ulna with just less than 1 shaft's width displacement and a nondisplaced fracture fragment impression.  - Comminuted distal radius angulated fracture with likely ulnar styloid fracture.  - Comminuted and open fracture dislocation of the right ankle  - Mildly displaced right lateral fifth rib fracture  -  Tiny central C3-4 disc herniation.    List Operative and Interventional Radiological Procedures:   - Open reduction and internal fixation (ORIF) of pilon fracture of right tibia   - Open reduction and internal fixation of fracture of right distal radius and ulna  - Open treatment, fracture, ulna, shaft comminuted grade 2 open right pilon fracture    - PTSD  [  ]  Date:_____  - SBIRT [  ]  Date:_____  - Geriatric consult [  ]  Date:_____  - GOC [  ]  Date:_____      Physical Exam: Alert and oriented, no acute distress.  Neuro:  Cranial nerves II-XII intact, limited motor exam due to casts. Strength 5/5 in unaffected areas.  HEENT: Normocephalic, atraumatic, no tenderness or swelling. Notable for a birthmark on the left lateral face.   Pulm/Chest: Clear breath sounds bilaterally, no wheezes, rales, or rhonchi.  Cardiac: Regular rate and rhythm, no murmurs, rubs, or gallops.  GI / Abdomen: Soft, non-tender, no organomegaly or masses.  Musculoskeletal / Extremities:   - Bilateral arm casts from elbows down limit movement; capillary refill <2 seconds in all fingers.  - Right leg cast capillary refill <2 seconds in toes.   - No signs of swelling or pressure sores near cast edges. Limb movement limited by casts.  Integumentary: skin intact around cast edges.    RADIOLOGICAL FINDINGS REVIEW:      ACC: [id number] EXAM: XR CHEST AP OR PA 1V ORDERED BY: GAURAV SKY    PROCEDURE DATE: 12/18/2024    INTERPRETATION: History: Trauma    Technique: Single frontal view of the chest    Comparison: Chest CT from the same day.    Findings:    Lungs: Clear. No pneumothorax or effusion.    Heart/Mediastinum: Within normal limits.    Osseous structures: Known right rib fracture is not well visualized on the plain film, better seen on CT.    There is no free air under the diaphragm.    Impression:  No radiographic evidence of traumatic injury. Known right fifth rib fracture is not well assessed on the current study. Please refer to CT for detailed description.    --- End of Report ---    ACC: [id number] EXAM: CT ANKLE ONLY RT ORDERED BY: ZORAN ALSTON    PROCEDURE DATE: 12/18/2024    INTERPRETATION: CLINICAL INDICATION: Open fracture with dislocation s/p reduction    TECHNIQUE: CT axial images of the right ankle were obtained without intravenous contrast. Coronal and sagittal reformatted images were also obtained.    CONTRAST/COMPLICATIONS:  IV Contrast: NONE  Complications: NA.    COMPARISON: XR: 12/18/2024. CT: None. MR: None.    FINDINGS: Evaluation of soft tissue is limited without intravenous contrast. Overlying plaster cast.    Bones: Interval reduction of prior fracture-dislocation with restoration of anatomic alignment. Acute, comminuted fracture involving the distal tibial metadiaphysis extending to the tibial plafond. Posteriorly displaced fracture of the posterior malleolus. Mildly laterally displaced fracture of the anterolateral tibial plafond.    Comminuted fracture of the distal fibula with mild anterior displacement and posteromedially displaced smaller fragments.    Degenerative changes at the ankle/foot. Plantar calcaneus spur. Nonspecific sclerotic foci at the talus.    Soft tissue: Wound along the right distal leg/ankle with air and stranding in the subjacent superficial and deeper soft tissue. Air at the ankle joint space. Diffuse muscle bulk loss with fatty replacement.. Achilles enthesopathy. Ankle joint effusion/hemarthrosis.    IMPRESSION:  Acute fractures of the right ankle status post reduction of the prior fracture-dislocation, as described.    --- End of Report ---      ACC: [id number] EXAM: CT CERVICAL SPINE ORDERED BY: GAURAV SKY  ACC: [id number] EXAM: CT ANGIO BRAIN (W)AW IC ORDERED BY: GAURAV SKY  ACC: [id number] EXAM: CT BRAIN ORDERED BY: GAURAV SKY  ACC: [id number] EXAM: CT ANGIO NECK (W)AW IC ORDERED BY: GAURAV SKY    PROCEDURE DATE: 12/18/2024    INTERPRETATION: Examinations were performed on this patient:  1. CT Angiography of the carotid arteries with IV contrast  2. CT Angiography of the intracranial circulation with IV contrast  3. CT Head without contrast    CLINICAL INFORMATION: Carotid injury. Intracranial vascular injury/aneurysm. Trauma    TECHNIQUE: Preceding intravenous contrast contiguous axial 4 mm sections were obtained through the head. CT angiography images at .5 mm thickness were acquired from the aortic arch to the vertex of the skull. Images were acquired during rapid bolus intravenous administration of 90 mL of Omnipaque 350 contrast/ 10 mL discarded. This data set was reconstructed axial 1 mm sections. Post processing maximum intensity projection angiographic reconstruction of images was performed. This data set was reconstructed and reviewed in 2 mm sagittal and coronal reformatted images to evaluate carotid morphology and intracranial vessels. The magnitude of stenosis was determined using NASCET criteria. This scan was performed using automatic exposure control (radiation dose reduction software) to obtain a diagnostic image quality scan with patient dose as low as reasonably achievable.    FINDINGS: No previous examinations are available for review.    The carotid circulation is intact without hemodynamically significant stenosis. The vertebral arteries are patent.    The intracranial circulation is intact without aneurysm, vascular malformation or abnormal vessel termination.    The brain demonstrates no abnormal enhancement, attenuation or mass-effect. No acute cerebral cortical infarct is seen. No intracranial hemorrhage is found. The ventricles, sulci and basal cisterns appear unremarkable.    The orbits are unremarkable. The paranasal sinuses are clear. The nasal cavity remains intact. The nasopharynx is symmetric. The central skull base, petrous temporal bones and calvarium remain intact.    CT cervical spine without IV contrast    CLINICAL INFORMATION: Fracture, trauma, neck pain. Neck pain, spinal stenosis, spondylosis. Trauma Code    TECHNIQUE: Contiguous axial 2.0 mm sections were obtained through the cervical spine using a single helical acquisition. Additional 2 mm sagittal and coronal reformatted reconstructions of the spine were obtained. These additional reformatted images were used to evaluate the spine for alignment, vertebral fractures and the integrity of the the posterior elements. This scan was performed using automatic exposure control (radiation dose reduction software) to obtain a diagnostic image quality scan with patient dose as low as reasonably achievable.    FINDINGS: No prior similar studies are available for review    Cervical vertebral body heights are maintained. No vertebral fracture is seen. No destructive bone lesion is found. Alignment is preserved. Facet joints appear intact and aligned.    Cervical intervertebral disc spaces appear intact. Tiny central disc herniation at C3-4. No significant productive changes are found. No high-grade central canal compromise is recognized by the CT technique. Neural foramina appear intact.  The skull base appears intact. No neck mass is recognized. Paraspinal soft tissues appear intact. Visualized lymph nodes appear to be within physiologic size limits.      IMPRESSION:     1. Right carotid system: No hemodynamically significant stenosis.   2. Left carotid system: No hemodynamically significant stenosis.   3. Intracranial circulation: No significant vascular lesion.   4. Brain: No significant lesion identified.   5. CT cervical spine: No vertebral fracture is recognized. Tiny central C3-4 disc herniation.    --- End of Report ---    ACC: [id number] EXAM: CT ANKLE ONLY RT ORDERED BY: ZORAN ALSTON  PROCEDURE DATE: 12/18/2024  INTERPRETATION: CLINICAL INDICATION: Open fracture with dislocation s/p reduction    TECHNIQUE: CT axial images of the right ankle were obtained without intravenous contrast. Coronal and sagittal reformatted images were also obtained.    CONTRAST/COMPLICATIONS:  IV Contrast: NONE  Complications: NA.    COMPARISON: XR: 12/18/2024. CT: None. MR: None.    FINDINGS: Evaluation of soft tissue is limited without intravenous contrast. Overlying plaster cast.    Bones: Interval reduction of prior fracture-dislocation with restoration of anatomic alignment. Acute, comminuted fracture involving the distal tibial metadiaphysis extending to the tibial plafond. Posteriorly displaced fracture of the posterior malleolus. Mildly laterally displaced fracture of the anterolateral tibial plafond.    Comminuted fracture of the distal fibula with mild anterior displacement and posteromedially displaced smaller fragments.    Degenerative changes at the ankle/foot. Plantar calcaneus spur. Nonspecific sclerotic foci at the talus.    Soft tissue: Wound along the right distal leg/ankle with air and stranding in the subjacent superficial and deeper soft tissue. Air at the ankle joint space. Diffuse muscle bulk loss with fatty replacement.. Achilles enthesopathy. Ankle joint effusion/hemarthrosis.    IMPRESSION:  Acute fractures of the right ankle status post reduction of the prior fracture-dislocation, as described.    --- End of Report ---    ACC: [id number] EXAM: XR ANKLE 2 VIEWS RT ORDERED BY: GAURAV SKY    PROCEDURE DATE: 12/18/2024    INTERPRETATION: History: Trauma.    TECHNIQUE: 2 views of the right ankle.    COMPARISON: None.    FINDINGS:  Fracture dislocation of the right ankle includes comminuted fracture of the distal fibula and tibia with vertical fracture line through the ankle mortise and lateral displacement of the distal tibia and fibula shafts and associated fracture fragments with only the medial malleolus remotely still related to the talus. This appears to be a compound fracture with disruption of the skin of the lateral malleolus CT is recommended for surgical planning.    IMPRESSION: Comminuted and open fracture dislocation of the right ankle as described. Recommend CT for better evaluation.    --- End of Report ---    ACC: [id number] EXAM: XR FOREARM 2 VIEWS LT ORDERED BY: GAURAV SKY    PROCEDURE DATE: 12/18/2024    INTERPRETATION: History: Trauma.  TECHNIQUE: 2 views of the left forearm, 2 views of the right wrist.  COMPARISON: None.  Left forearm: Comminuted mildly displaced midshaft fracture of the ulna is noted. There is less than 1 shaft width medial displacement of the distal fracture fragments. The radius is intact. What appears to be an IV is noted on the superficial proximal forearm.  Right wrist: Comminuted fracture of the distal radius that appears to still extend into the radiocarpal joint space as well as likely fracture of the ulnar styloid noted with angulation of the radial fracture causing dorsiflexion proximal to the wrist joint. The overlying soft tissues are swollen no radiopaque foreign body is seen.    IMPRESSION:  Nightstick fracture of the left ulna with just less than 1 shaft's width displacement and a nondisplaced fracture fragment impression.  Comminuted distal radius angulated fracture with likely ulnar styloid fracture.    --- End of Report ---    ACC: [id number] EXAM: CT CHEST IC ORDERED BY: GAURAV SKY    PROCEDURE DATE: 12/18/2024    INTERPRETATION: CLINICAL INFORMATION: Trauma.    COMPARISON: 10/21/2023    CONTRAST/COMPLICATIONS:  IV Contrast: IV contrast documented in unlinked concurrent exam  Oral Contrast: NONE    PROCEDURE:  CT of the Chest, Abdomen and Pelvis was performed.  Imaging was performed through the chest in the arterial phase followed by imaging of the abdomen and pelvis in the portal venous phase.  Sagittal and coronal reformats were performed.    FINDINGS:  CHEST:  LUNGS AND LARGE AIRWAYS: Patent central airways. Mild dependent atelectasis. No suspicious lung nodules. No evidence of consolidative pneumonia. No evidence of a pneumothorax.  PLEURA: No pleural effusion.  VESSELS: Within normal limits.  HEART: Heart size is normal. No pericardial effusion.  MEDIASTINUM AND REBECA: No lymphadenopathy.  CHEST WALL AND LOWER NECK: Within normal limits.    ABDOMEN AND PELVIS:  LIVER: Enlarged with diffuse steatosis. No suspicious liver lesions.  BILE DUCTS: Normal caliber.  GALLBLADDER: Within normal limits.  SPLEEN: Within normal limits.  PANCREAS: Within normal limits.  ADRENALS: Within normal limits.  KIDNEYS/URETERS: Subcentimeter indeterminate hypodensity within the left kidney is statistically favored to represent a cyst but is too small to adequately characterize; consider outpatient ultrasound for further assessment, if clinically indicated.    BLADDER: Within normal limits.  REPRODUCTIVE ORGANS: Hysterectomy.    BOWEL: No bowel obstruction. Appendix is normal. A few colonic diverticula are present without acute diverticulitis.  PERITONEUM/RETROPERITONEUM: Within normal limits.  VESSELS: Within normal limits.  LYMPH NODES: No lymphadenopathy.  ABDOMINAL WALL: Small fat-containing clinical hernia.  BONES: Mildly displaced right lateral fifth rib fracture. No evidence of pleural effusions or pneumothorax.    IMPRESSION:  Mildly displaced right lateral fifth rib fracture. No evidence of pleural effusions or pneumothorax. No other acute thoracic, abdominal, or pelvic pathology detected. Read above text for additional findings, and detailed explanations.      INCIDENTAL FINDINGS:  - Subcentimeter indeterminate hypodensity within the left kidney is statistically favored to represent a cyst but is too small to adequately characterize; consider outpatient ultrasound for further assessment, if clinically indicated.  - Small fat-containing clinical hernia.      [x] Tertiary exam complete, there are no new injuries identified Tertiary Trauma Survey (TTS)    Date of TTS: 12-20-24 @ 14:44                             Admit Date: 12-18-24 @ 18:23      Trauma Activation:    List Injuries Identified to Date:  - Nightstick fracture of the left ulna with just less than 1 shaft's width displacement and a nondisplaced fracture fragment impression.  - Comminuted distal radius angulated fracture with likely ulnar styloid fracture.  - Comminuted and open fracture dislocation of the right ankle  - Mildly displaced right lateral fifth rib fracture  - Tiny central C3-4 disc herniation.    List Operative and Interventional Radiological Procedures:   - Open reduction and internal fixation (ORIF) of pilon fracture of right tibia   - Open reduction and internal fixation of fracture of right distal radius and ulna  - Open treatment, fracture, ulna, shaft comminuted grade 2 open right pilon fracture    - PTSD  [  ]  Date:_____  - SBIRT [  ]  Date:_____  - Geriatric consult [  ]  Date:_____  - GOC [  ]  Date:_____      Physical Exam: Alert and oriented, no acute distress.  Neuro:  Cranial nerves II-XII intact, limited motor exam due to casts. Strength 5/5 in unaffected areas.  HEENT: Normocephalic, atraumatic, no tenderness or swelling. Notable for a birthmark on the left lateral face.   Pulm/Chest: Clear breath sounds bilaterally, no wheezes, rales, or rhonchi.  Cardiac: Regular rate and rhythm, no murmurs, rubs, or gallops.  GI / Abdomen: Soft, non-tender, no organomegaly or masses.  Musculoskeletal / Extremities:   - Bilateral arm casts from elbows down limit movement; capillary refill <2 seconds in all fingers.  - Right leg cast capillary refill <2 seconds in toes.   - No signs of swelling or pressure sores near cast edges. Limb movement limited by casts.  Integumentary: skin intact around cast edges.    RADIOLOGICAL FINDINGS REVIEW:      ACC: [id number] EXAM: XR CHEST AP OR PA 1V ORDERED BY: GAURAV SKY    PROCEDURE DATE: 12/18/2024    INTERPRETATION: History: Trauma    Technique: Single frontal view of the chest    Comparison: Chest CT from the same day.    Findings:    Lungs: Clear. No pneumothorax or effusion.    Heart/Mediastinum: Within normal limits.    Osseous structures: Known right rib fracture is not well visualized on the plain film, better seen on CT.    There is no free air under the diaphragm.    Impression:  No radiographic evidence of traumatic injury. Known right fifth rib fracture is not well assessed on the current study. Please refer to CT for detailed description.    --- End of Report ---    ACC: [id number] EXAM: CT ANKLE ONLY RT ORDERED BY: ZORAN ALSTON    PROCEDURE DATE: 12/18/2024    INTERPRETATION: CLINICAL INDICATION: Open fracture with dislocation s/p reduction    TECHNIQUE: CT axial images of the right ankle were obtained without intravenous contrast. Coronal and sagittal reformatted images were also obtained.    CONTRAST/COMPLICATIONS:  IV Contrast: NONE  Complications: NA.    COMPARISON: XR: 12/18/2024. CT: None. MR: None.    FINDINGS: Evaluation of soft tissue is limited without intravenous contrast. Overlying plaster cast.    Bones: Interval reduction of prior fracture-dislocation with restoration of anatomic alignment. Acute, comminuted fracture involving the distal tibial metadiaphysis extending to the tibial plafond. Posteriorly displaced fracture of the posterior malleolus. Mildly laterally displaced fracture of the anterolateral tibial plafond.    Comminuted fracture of the distal fibula with mild anterior displacement and posteromedially displaced smaller fragments.    Degenerative changes at the ankle/foot. Plantar calcaneus spur. Nonspecific sclerotic foci at the talus.    Soft tissue: Wound along the right distal leg/ankle with air and stranding in the subjacent superficial and deeper soft tissue. Air at the ankle joint space. Diffuse muscle bulk loss with fatty replacement.. Achilles enthesopathy. Ankle joint effusion/hemarthrosis.    IMPRESSION:  Acute fractures of the right ankle status post reduction of the prior fracture-dislocation, as described.    --- End of Report ---      ACC: [id number] EXAM: CT CERVICAL SPINE ORDERED BY: GAURAV SKY  ACC: [id number] EXAM: CT ANGIO BRAIN (W)AW IC ORDERED BY: GAURAV SKY  ACC: [id number] EXAM: CT BRAIN ORDERED BY: GAURAV SKY  ACC: [id number] EXAM: CT ANGIO NECK (W)AW IC ORDERED BY: GAURAV SKY    PROCEDURE DATE: 12/18/2024    INTERPRETATION: Examinations were performed on this patient:  1. CT Angiography of the carotid arteries with IV contrast  2. CT Angiography of the intracranial circulation with IV contrast  3. CT Head without contrast    CLINICAL INFORMATION: Carotid injury. Intracranial vascular injury/aneurysm. Trauma    TECHNIQUE: Preceding intravenous contrast contiguous axial 4 mm sections were obtained through the head. CT angiography images at .5 mm thickness were acquired from the aortic arch to the vertex of the skull. Images were acquired during rapid bolus intravenous administration of 90 mL of Omnipaque 350 contrast/ 10 mL discarded. This data set was reconstructed axial 1 mm sections. Post processing maximum intensity projection angiographic reconstruction of images was performed. This data set was reconstructed and reviewed in 2 mm sagittal and coronal reformatted images to evaluate carotid morphology and intracranial vessels. The magnitude of stenosis was determined using NASCET criteria. This scan was performed using automatic exposure control (radiation dose reduction software) to obtain a diagnostic image quality scan with patient dose as low as reasonably achievable.    FINDINGS: No previous examinations are available for review.    The carotid circulation is intact without hemodynamically significant stenosis. The vertebral arteries are patent.    The intracranial circulation is intact without aneurysm, vascular malformation or abnormal vessel termination.    The brain demonstrates no abnormal enhancement, attenuation or mass-effect. No acute cerebral cortical infarct is seen. No intracranial hemorrhage is found. The ventricles, sulci and basal cisterns appear unremarkable.    The orbits are unremarkable. The paranasal sinuses are clear. The nasal cavity remains intact. The nasopharynx is symmetric. The central skull base, petrous temporal bones and calvarium remain intact.    CT cervical spine without IV contrast    CLINICAL INFORMATION: Fracture, trauma, neck pain. Neck pain, spinal stenosis, spondylosis. Trauma Code    TECHNIQUE: Contiguous axial 2.0 mm sections were obtained through the cervical spine using a single helical acquisition. Additional 2 mm sagittal and coronal reformatted reconstructions of the spine were obtained. These additional reformatted images were used to evaluate the spine for alignment, vertebral fractures and the integrity of the the posterior elements. This scan was performed using automatic exposure control (radiation dose reduction software) to obtain a diagnostic image quality scan with patient dose as low as reasonably achievable.    FINDINGS: No prior similar studies are available for review    Cervical vertebral body heights are maintained. No vertebral fracture is seen. No destructive bone lesion is found. Alignment is preserved. Facet joints appear intact and aligned.    Cervical intervertebral disc spaces appear intact. Tiny central disc herniation at C3-4. No significant productive changes are found. No high-grade central canal compromise is recognized by the CT technique. Neural foramina appear intact.  The skull base appears intact. No neck mass is recognized. Paraspinal soft tissues appear intact. Visualized lymph nodes appear to be within physiologic size limits.      IMPRESSION:     1. Right carotid system: No hemodynamically significant stenosis.   2. Left carotid system: No hemodynamically significant stenosis.   3. Intracranial circulation: No significant vascular lesion.   4. Brain: No significant lesion identified.   5. CT cervical spine: No vertebral fracture is recognized. Tiny central C3-4 disc herniation.    --- End of Report ---    ACC: [id number] EXAM: CT ANKLE ONLY RT ORDERED BY: ZORAN ALSTON  PROCEDURE DATE: 12/18/2024  INTERPRETATION: CLINICAL INDICATION: Open fracture with dislocation s/p reduction    TECHNIQUE: CT axial images of the right ankle were obtained without intravenous contrast. Coronal and sagittal reformatted images were also obtained.    CONTRAST/COMPLICATIONS:  IV Contrast: NONE  Complications: NA.    COMPARISON: XR: 12/18/2024. CT: None. MR: None.    FINDINGS: Evaluation of soft tissue is limited without intravenous contrast. Overlying plaster cast.    Bones: Interval reduction of prior fracture-dislocation with restoration of anatomic alignment. Acute, comminuted fracture involving the distal tibial metadiaphysis extending to the tibial plafond. Posteriorly displaced fracture of the posterior malleolus. Mildly laterally displaced fracture of the anterolateral tibial plafond.    Comminuted fracture of the distal fibula with mild anterior displacement and posteromedially displaced smaller fragments.    Degenerative changes at the ankle/foot. Plantar calcaneus spur. Nonspecific sclerotic foci at the talus.    Soft tissue: Wound along the right distal leg/ankle with air and stranding in the subjacent superficial and deeper soft tissue. Air at the ankle joint space. Diffuse muscle bulk loss with fatty replacement.. Achilles enthesopathy. Ankle joint effusion/hemarthrosis.    IMPRESSION:  Acute fractures of the right ankle status post reduction of the prior fracture-dislocation, as described.    --- End of Report ---    ACC: [id number] EXAM: XR ANKLE 2 VIEWS RT ORDERED BY: GAURAV SKY    PROCEDURE DATE: 12/18/2024    INTERPRETATION: History: Trauma.    TECHNIQUE: 2 views of the right ankle.    COMPARISON: None.    FINDINGS:  Fracture dislocation of the right ankle includes comminuted fracture of the distal fibula and tibia with vertical fracture line through the ankle mortise and lateral displacement of the distal tibia and fibula shafts and associated fracture fragments with only the medial malleolus remotely still related to the talus. This appears to be a compound fracture with disruption of the skin of the lateral malleolus CT is recommended for surgical planning.    IMPRESSION: Comminuted and open fracture dislocation of the right ankle as described. Recommend CT for better evaluation.    --- End of Report ---    ACC: [id number] EXAM: XR FOREARM 2 VIEWS LT ORDERED BY: GAURAV SKY    PROCEDURE DATE: 12/18/2024    INTERPRETATION: History: Trauma.  TECHNIQUE: 2 views of the left forearm, 2 views of the right wrist.  COMPARISON: None.  Left forearm: Comminuted mildly displaced midshaft fracture of the ulna is noted. There is less than 1 shaft width medial displacement of the distal fracture fragments. The radius is intact. What appears to be an IV is noted on the superficial proximal forearm.  Right wrist: Comminuted fracture of the distal radius that appears to still extend into the radiocarpal joint space as well as likely fracture of the ulnar styloid noted with angulation of the radial fracture causing dorsiflexion proximal to the wrist joint. The overlying soft tissues are swollen no radiopaque foreign body is seen.    IMPRESSION:  Nightstick fracture of the left ulna with just less than 1 shaft's width displacement and a nondisplaced fracture fragment impression.  Comminuted distal radius angulated fracture with likely ulnar styloid fracture.    --- End of Report ---    ACC: [id number] EXAM: CT CHEST IC ORDERED BY: GAURAV SKY    PROCEDURE DATE: 12/18/2024    INTERPRETATION: CLINICAL INFORMATION: Trauma.    COMPARISON: 10/21/2023    CONTRAST/COMPLICATIONS:  IV Contrast: IV contrast documented in unlinked concurrent exam  Oral Contrast: NONE    PROCEDURE:  CT of the Chest, Abdomen and Pelvis was performed.  Imaging was performed through the chest in the arterial phase followed by imaging of the abdomen and pelvis in the portal venous phase.  Sagittal and coronal reformats were performed.    FINDINGS:  CHEST:  LUNGS AND LARGE AIRWAYS: Patent central airways. Mild dependent atelectasis. No suspicious lung nodules. No evidence of consolidative pneumonia. No evidence of a pneumothorax.  PLEURA: No pleural effusion.  VESSELS: Within normal limits.  HEART: Heart size is normal. No pericardial effusion.  MEDIASTINUM AND REBECA: No lymphadenopathy.  CHEST WALL AND LOWER NECK: Within normal limits.    ABDOMEN AND PELVIS:  LIVER: Enlarged with diffuse steatosis. No suspicious liver lesions.  BILE DUCTS: Normal caliber.  GALLBLADDER: Within normal limits.  SPLEEN: Within normal limits.  PANCREAS: Within normal limits.  ADRENALS: Within normal limits.  KIDNEYS/URETERS: Subcentimeter indeterminate hypodensity within the left kidney is statistically favored to represent a cyst but is too small to adequately characterize; consider outpatient ultrasound for further assessment, if clinically indicated.    BLADDER: Within normal limits.  REPRODUCTIVE ORGANS: Hysterectomy.    BOWEL: No bowel obstruction. Appendix is normal. A few colonic diverticula are present without acute diverticulitis.  PERITONEUM/RETROPERITONEUM: Within normal limits.  VESSELS: Within normal limits.  LYMPH NODES: No lymphadenopathy.  ABDOMINAL WALL: Small fat-containing clinical hernia.  BONES: Mildly displaced right lateral fifth rib fracture. No evidence of pleural effusions or pneumothorax.    IMPRESSION:  Mildly displaced right lateral fifth rib fracture. No evidence of pleural effusions or pneumothorax. No other acute thoracic, abdominal, or pelvic pathology detected. Read above text for additional findings, and detailed explanations.      INCIDENTAL FINDINGS:  - Subcentimeter indeterminate hypodensity within the left kidney is statistically favored to represent a cyst but is too small to adequately characterize; consider outpatient ultrasound for further assessment, if clinically indicated.  - Small fat-containing clinical hernia.      [x] Tertiary exam complete, there are no new injuries identified

## 2024-12-20 NOTE — PROGRESS NOTE ADULT - SUBJECTIVE AND OBJECTIVE BOX
INTERVAL HPI/OVERNIGHT EVENTS: no new complaints, minimal use of her hands, no overnight events    STATUS POST:  Open reduction and internal fixation (ORIF) of pilon fracture of right tibia, Open reduction and internal fixation of fracture of right distal radius and ulna , Open treatment, fracture, L ulna, shaft 24      MEDICATIONS  (STANDING):  acetaminophen     Tablet .. 975 milliGRAM(s) Oral every 6 hours  cefTRIAXone Injectable. 1000 milliGRAM(s) IV Push every 24 hours  dextrose 5%. 1000 milliLiter(s) (50 mL/Hr) IV Continuous <Continuous>  dextrose 5%. 1000 milliLiter(s) (100 mL/Hr) IV Continuous <Continuous>  dextrose 50% Injectable 25 Gram(s) IV Push once  dextrose 50% Injectable 12.5 Gram(s) IV Push once  dextrose 50% Injectable 25 Gram(s) IV Push once  enoxaparin Injectable 40 milliGRAM(s) SubCutaneous every 24 hours  gabapentin 300 milliGRAM(s) Oral every 8 hours  glucagon  Injectable 1 milliGRAM(s) IntraMuscular once  influenza   Vaccine 0.5 milliLiter(s) IntraMuscular once  insulin lispro (ADMELOG) corrective regimen sliding scale   SubCutaneous three times a day before meals  ketorolac   Injectable 15 milliGRAM(s) IV Push every 6 hours  lidocaine   4% Patch 2 Patch Transdermal every 24 hours  methocarbamol 750 milliGRAM(s) Oral every 6 hours  vancomycin  IVPB 1250 milliGRAM(s) IV Intermittent once    MEDICATIONS  (PRN):  dextrose Oral Gel 15 Gram(s) Oral once PRN Blood Glucose LESS THAN 70 milliGRAM(s)/deciliter      Vital Signs Last 24 Hrs  T(C): 37.2 (20 Dec 2024 12:42), Max: 37.2 (20 Dec 2024 04:46)  T(F): 98.9 (20 Dec 2024 12:42), Max: 98.9 (20 Dec 2024 04:46)  HR: 80 (20 Dec 2024 12:42) (63 - 80)  BP: 143/78 (20 Dec 2024 12:42) (128/72 - 170/85)  BP(mean): 101 (19 Dec 2024 20:00) (97 - 115)  RR: 14 (20 Dec 2024 12:42) (13 - 20)  SpO2: 93% (20 Dec 2024 12:42) (92% - 100%)    Parameters below as of 20 Dec 2024 12:42  Patient On (Oxygen Delivery Method): room air        PHYSICAL EXAM:      Constitutional: NAD    Respiratory: no accessory muscle use or conversational dyspnea    Cardiovascular: RRR    Gastrointestinal: soft, NT/ND    Extremities: b/l UE splints and RLE splints in place          I&O's Detail      LABS:                        9.8    6.52  )-----------( 159      ( 20 Dec 2024 06:20 )             29.5     12-20    134[L]  |  101  |  14.8  ----------------------------<  220[H]  4.0   |  23.0  |  0.46[L]    Ca    8.3[L]      20 Dec 2024 06:20  Phos  2.8     12-20  Mg     2.2     12-20    TPro  7.2  /  Alb  4.6  /  TBili  0.8  /  DBili  x   /  AST  66[H]  /  ALT  66[H]  /  AlkPhos  90  12-18    PT/INR - ( 18 Dec 2024 16:50 )   PT: 10.7 sec;   INR: 0.95 ratio         PTT - ( 18 Dec 2024 16:50 )  PTT:25.3 sec  Urinalysis Basic - ( 20 Dec 2024 06:44 )    Color: Yellow / Appearance: Clear / S.025 / pH: x  Gluc: x / Ketone: Trace mg/dL  / Bili: Negative / Urobili: 0.2 mg/dL   Blood: x / Protein: Negative mg/dL / Nitrite: Negative   Leuk Esterase: Negative / RBC: x / WBC x   Sq Epi: x / Non Sq Epi: x / Bacteria: x        RADIOLOGY & ADDITIONAL STUDIES:

## 2024-12-21 LAB
ANION GAP SERPL CALC-SCNC: 13 MMOL/L — SIGNIFICANT CHANGE UP (ref 5–17)
BASOPHILS # BLD AUTO: 0.03 K/UL — SIGNIFICANT CHANGE UP (ref 0–0.2)
BASOPHILS NFR BLD AUTO: 0.4 % — SIGNIFICANT CHANGE UP (ref 0–2)
BUN SERPL-MCNC: 14.2 MG/DL — SIGNIFICANT CHANGE UP (ref 8–20)
CALCIUM SERPL-MCNC: 8.3 MG/DL — LOW (ref 8.4–10.5)
CHLORIDE SERPL-SCNC: 108 MMOL/L — SIGNIFICANT CHANGE UP (ref 96–108)
CO2 SERPL-SCNC: 22 MMOL/L — SIGNIFICANT CHANGE UP (ref 22–29)
CREAT SERPL-MCNC: 0.57 MG/DL — SIGNIFICANT CHANGE UP (ref 0.5–1.3)
EGFR: 109 ML/MIN/1.73M2 — SIGNIFICANT CHANGE UP
EOSINOPHIL # BLD AUTO: 0.21 K/UL — SIGNIFICANT CHANGE UP (ref 0–0.5)
EOSINOPHIL NFR BLD AUTO: 3 % — SIGNIFICANT CHANGE UP (ref 0–6)
GLUCOSE BLDC GLUCOMTR-MCNC: 162 MG/DL — HIGH (ref 70–99)
GLUCOSE BLDC GLUCOMTR-MCNC: 167 MG/DL — HIGH (ref 70–99)
GLUCOSE BLDC GLUCOMTR-MCNC: 240 MG/DL — HIGH (ref 70–99)
GLUCOSE BLDC GLUCOMTR-MCNC: 244 MG/DL — HIGH (ref 70–99)
GLUCOSE SERPL-MCNC: 174 MG/DL — HIGH (ref 70–99)
HCT VFR BLD CALC: 30.1 % — LOW (ref 34.5–45)
HGB BLD-MCNC: 9.8 G/DL — LOW (ref 11.5–15.5)
IMM GRANULOCYTES NFR BLD AUTO: 0.4 % — SIGNIFICANT CHANGE UP (ref 0–0.9)
LYMPHOCYTES # BLD AUTO: 2.45 K/UL — SIGNIFICANT CHANGE UP (ref 1–3.3)
LYMPHOCYTES # BLD AUTO: 34.8 % — SIGNIFICANT CHANGE UP (ref 13–44)
MAGNESIUM SERPL-MCNC: 2.1 MG/DL — SIGNIFICANT CHANGE UP (ref 1.6–2.6)
MCHC RBC-ENTMCNC: 28.7 PG — SIGNIFICANT CHANGE UP (ref 27–34)
MCHC RBC-ENTMCNC: 32.6 G/DL — SIGNIFICANT CHANGE UP (ref 32–36)
MCV RBC AUTO: 88.3 FL — SIGNIFICANT CHANGE UP (ref 80–100)
MONOCYTES # BLD AUTO: 0.62 K/UL — SIGNIFICANT CHANGE UP (ref 0–0.9)
MONOCYTES NFR BLD AUTO: 8.8 % — SIGNIFICANT CHANGE UP (ref 2–14)
NEUTROPHILS # BLD AUTO: 3.71 K/UL — SIGNIFICANT CHANGE UP (ref 1.8–7.4)
NEUTROPHILS NFR BLD AUTO: 52.6 % — SIGNIFICANT CHANGE UP (ref 43–77)
PHOSPHATE SERPL-MCNC: 2.3 MG/DL — LOW (ref 2.4–4.7)
PLATELET # BLD AUTO: 162 K/UL — SIGNIFICANT CHANGE UP (ref 150–400)
POTASSIUM SERPL-MCNC: 4.2 MMOL/L — SIGNIFICANT CHANGE UP (ref 3.5–5.3)
POTASSIUM SERPL-SCNC: 4.2 MMOL/L — SIGNIFICANT CHANGE UP (ref 3.5–5.3)
RBC # BLD: 3.41 M/UL — LOW (ref 3.8–5.2)
RBC # FLD: 13.4 % — SIGNIFICANT CHANGE UP (ref 10.3–14.5)
SODIUM SERPL-SCNC: 143 MMOL/L — SIGNIFICANT CHANGE UP (ref 135–145)
WBC # BLD: 7.05 K/UL — SIGNIFICANT CHANGE UP (ref 3.8–10.5)
WBC # FLD AUTO: 7.05 K/UL — SIGNIFICANT CHANGE UP (ref 3.8–10.5)

## 2024-12-21 RX ORDER — SOD PHOS DI, MONO/K PHOS MONO 250 MG
2 TABLET ORAL EVERY 4 HOURS
Refills: 0 | Status: COMPLETED | OUTPATIENT
Start: 2024-12-21 | End: 2024-12-21

## 2024-12-21 RX ADMIN — ACETAMINOPHEN 975 MILLIGRAM(S): 80 SOLUTION/ DROPS ORAL at 09:02

## 2024-12-21 RX ADMIN — KETOROLAC TROMETHAMINE 15 MILLIGRAM(S): 30 INJECTION INTRAMUSCULAR; INTRAVENOUS at 14:09

## 2024-12-21 RX ADMIN — Medication 1: at 08:32

## 2024-12-21 RX ADMIN — LIDOCAINE 2 PATCH: 50 OINTMENT TOPICAL at 23:38

## 2024-12-21 RX ADMIN — Medication 750 MILLIGRAM(S): at 08:32

## 2024-12-21 RX ADMIN — Medication 750 MILLIGRAM(S): at 20:50

## 2024-12-21 RX ADMIN — LIDOCAINE 2 PATCH: 50 OINTMENT TOPICAL at 08:47

## 2024-12-21 RX ADMIN — Medication 2 TABLET(S): at 14:08

## 2024-12-21 RX ADMIN — Medication 2 TABLET(S): at 13:12

## 2024-12-21 RX ADMIN — ACETAMINOPHEN 975 MILLIGRAM(S): 80 SOLUTION/ DROPS ORAL at 14:09

## 2024-12-21 RX ADMIN — Medication 750 MILLIGRAM(S): at 03:47

## 2024-12-21 RX ADMIN — KETOROLAC TROMETHAMINE 15 MILLIGRAM(S): 30 INJECTION INTRAMUSCULAR; INTRAVENOUS at 09:03

## 2024-12-21 RX ADMIN — ACETAMINOPHEN 975 MILLIGRAM(S): 80 SOLUTION/ DROPS ORAL at 20:50

## 2024-12-21 RX ADMIN — ACETAMINOPHEN 975 MILLIGRAM(S): 80 SOLUTION/ DROPS ORAL at 21:50

## 2024-12-21 RX ADMIN — KETOROLAC TROMETHAMINE 15 MILLIGRAM(S): 30 INJECTION INTRAMUSCULAR; INTRAVENOUS at 20:49

## 2024-12-21 RX ADMIN — ACETAMINOPHEN 975 MILLIGRAM(S): 80 SOLUTION/ DROPS ORAL at 03:47

## 2024-12-21 RX ADMIN — GABAPENTIN 300 MILLIGRAM(S): 300 CAPSULE ORAL at 05:34

## 2024-12-21 RX ADMIN — KETOROLAC TROMETHAMINE 15 MILLIGRAM(S): 30 INJECTION INTRAMUSCULAR; INTRAVENOUS at 08:33

## 2024-12-21 RX ADMIN — Medication 1: at 16:32

## 2024-12-21 RX ADMIN — KETOROLAC TROMETHAMINE 15 MILLIGRAM(S): 30 INJECTION INTRAMUSCULAR; INTRAVENOUS at 04:47

## 2024-12-21 RX ADMIN — GABAPENTIN 300 MILLIGRAM(S): 300 CAPSULE ORAL at 20:50

## 2024-12-21 RX ADMIN — Medication 2: at 12:43

## 2024-12-21 RX ADMIN — GABAPENTIN 300 MILLIGRAM(S): 300 CAPSULE ORAL at 13:09

## 2024-12-21 RX ADMIN — ENOXAPARIN SODIUM 40 MILLIGRAM(S): 60 INJECTION INTRAVENOUS; SUBCUTANEOUS at 05:34

## 2024-12-21 RX ADMIN — ACETAMINOPHEN 975 MILLIGRAM(S): 80 SOLUTION/ DROPS ORAL at 04:47

## 2024-12-21 RX ADMIN — Medication 750 MILLIGRAM(S): at 14:09

## 2024-12-21 RX ADMIN — KETOROLAC TROMETHAMINE 15 MILLIGRAM(S): 30 INJECTION INTRAMUSCULAR; INTRAVENOUS at 03:47

## 2024-12-21 RX ADMIN — KETOROLAC TROMETHAMINE 15 MILLIGRAM(S): 30 INJECTION INTRAMUSCULAR; INTRAVENOUS at 14:39

## 2024-12-21 RX ADMIN — KETOROLAC TROMETHAMINE 15 MILLIGRAM(S): 30 INJECTION INTRAMUSCULAR; INTRAVENOUS at 21:49

## 2024-12-21 RX ADMIN — ACETAMINOPHEN 975 MILLIGRAM(S): 80 SOLUTION/ DROPS ORAL at 08:32

## 2024-12-21 RX ADMIN — ACETAMINOPHEN 975 MILLIGRAM(S): 80 SOLUTION/ DROPS ORAL at 14:39

## 2024-12-21 RX ADMIN — CEFTRIAXONE SODIUM 1000 MILLIGRAM(S): 1 INJECTION, POWDER, FOR SOLUTION INTRAMUSCULAR; INTRAVENOUS at 21:34
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
## 2024-12-21 NOTE — PROGRESS NOTE ADULT - NS ATTEND AMEND GEN_ALL_CORE FT
Above assessment noted.  The patient was seen and examined by myself with the surgical PA.  The patient is without new events or complaints. The patient is trauma stable for discharge planning.

## 2024-12-21 NOTE — PROGRESS NOTE ADULT - SUBJECTIVE AND OBJECTIVE BOX
YUNI JEWEL    [id number]    History: 54y Female is status post right ankle I&D/ORIF, right distal radius ORIF, left forearm ORIF POD # 2.  Patient is doing well and is comfortable. The patient's pain is controlled using the prescribed pain medications. The patient is participating in physical therapy. Denies nausea, vomiting, chest pain, shortness of breath, abdominal pain or fever. No new complaints.    Vital Signs Last 24 Hrs  T(C): 36.8 (21 Dec 2024 04:01), Max: 37.3 (20 Dec 2024 20:13)  T(F): 98.3 (21 Dec 2024 04:01), Max: 99.2 (20 Dec 2024 20:13)  HR: 68 (21 Dec 2024 04:01) (64 - 86)  BP: 125/76 (21 Dec 2024 04:01) (122/72 - 143/83)  BP(mean): --  RR: 18 (21 Dec 2024 04:01) (14 - 18)  SpO2: 92% (21 Dec 2024 04:01) (92% - 93%)    Parameters below as of 21 Dec 2024 04:01  Patient On (Oxygen Delivery Method): room air                              9.8    7.05  )-----------( 162      ( 21 Dec 2024 05:33 )             30.1     12-21    143  |  108  |  14.2  ----------------------------<  174[H]  4.2   |  22.0  |  0.57    Ca    8.3[L]      21 Dec 2024 05:33  Phos  2.3     12-21  Mg     2.1     12-21        MEDICATIONS  (STANDING):  acetaminophen     Tablet .. 975 milliGRAM(s) Oral every 6 hours  cefTRIAXone Injectable. 1000 milliGRAM(s) IV Push every 24 hours  dextrose 5%. 1000 milliLiter(s) (50 mL/Hr) IV Continuous <Continuous>  dextrose 5%. 1000 milliLiter(s) (100 mL/Hr) IV Continuous <Continuous>  dextrose 50% Injectable 25 Gram(s) IV Push once  dextrose 50% Injectable 12.5 Gram(s) IV Push once  dextrose 50% Injectable 25 Gram(s) IV Push once  enoxaparin Injectable 40 milliGRAM(s) SubCutaneous every 24 hours  gabapentin 300 milliGRAM(s) Oral every 8 hours  glucagon  Injectable 1 milliGRAM(s) IntraMuscular once  influenza   Vaccine 0.5 milliLiter(s) IntraMuscular once  insulin lispro (ADMELOG) corrective regimen sliding scale   SubCutaneous three times a day before meals  ketorolac   Injectable 15 milliGRAM(s) IV Push every 6 hours  lidocaine   4% Patch 2 Patch Transdermal every 24 hours  methocarbamol 750 milliGRAM(s) Oral every 6 hours  vancomycin  IVPB 1250 milliGRAM(s) IV Intermittent once    MEDICATIONS  (PRN):  dextrose Oral Gel 15 Gram(s) Oral once PRN Blood Glucose LESS THAN 70 milliGRAM(s)/deciliter      Vital Signs Last 24 Hrs  T(C): 36.8 (21 Dec 2024 04:01), Max: 37.3 (20 Dec 2024 20:13)  T(F): 98.3 (21 Dec 2024 04:01), Max: 99.2 (20 Dec 2024 20:13)  HR: 68 (21 Dec 2024 04:01) (64 - 86)  BP: 125/76 (21 Dec 2024 04:01) (122/72 - 143/83)  BP(mean): --  RR: 18 (21 Dec 2024 04:01) (14 - 18)  SpO2: 92% (21 Dec 2024 04:01) (92% - 93%)    Parameters below as of 21 Dec 2024 04:01  Patient On (Oxygen Delivery Method): room air      Daily     Daily     Appearance: Alert, responsive, in no acute distress.    Skin: no rash on visible skin. Skin is clean, dry and intact. No bleeding. No abrasions. No ulcerations.    Musculoskeletal:         Left Upper Extremity:  Left forearm splint in place. Dressings clean, dry, intact. Sensation is grossly intact to light touch. 2+ distal pulses. Cap refill < 2 sec. +ROM of wrist/digits       Right Upper Extremity: Right wrist sugartong splint in place.  Dressings clean, dry, intact. Sensation is grossly intact to light touch. 2+ distal pulses. Cap refill < 2 sec. +ROM of digits       Right Lower Extremity: right ankle short leg splint in place.  Dressings clean, dry, intact. Sensation is grossly intact to light touch. 2+ distal pulses. Cap refill < 2 sec. +ROM of digits.       Primary Orthopedic Assessment:  • 54y Female is status post right ankle I&D/ORIF, right distal radius ORIF, left forearm ORIF POD # 2.    Secondary  Orthopedic Assessment(s):   •     Secondary  Medical Assessment(s):   •     Plan:   • DVT prophylaxis as prescribed, including use of compression devices and ankle pumps  • Continue physical therapy  • NWB right wrist, right ankle, left forearm. Can weight bear through b/l elbows and right knee.  • Incentive spirometry encouraged  • Pain control as clinically indicated  • Discharge planning – anticipated discharge is for subacute rehabilitation

## 2024-12-22 LAB
ANION GAP SERPL CALC-SCNC: 11 MMOL/L — SIGNIFICANT CHANGE UP (ref 5–17)
BASOPHILS # BLD AUTO: 0.04 K/UL — SIGNIFICANT CHANGE UP (ref 0–0.2)
BASOPHILS NFR BLD AUTO: 0.6 % — SIGNIFICANT CHANGE UP (ref 0–2)
BUN SERPL-MCNC: 13.9 MG/DL — SIGNIFICANT CHANGE UP (ref 8–20)
CALCIUM SERPL-MCNC: 8.7 MG/DL — SIGNIFICANT CHANGE UP (ref 8.4–10.5)
CHLORIDE SERPL-SCNC: 104 MMOL/L — SIGNIFICANT CHANGE UP (ref 96–108)
CO2 SERPL-SCNC: 23 MMOL/L — SIGNIFICANT CHANGE UP (ref 22–29)
CREAT SERPL-MCNC: 0.49 MG/DL — LOW (ref 0.5–1.3)
EGFR: 113 ML/MIN/1.73M2 — SIGNIFICANT CHANGE UP
EOSINOPHIL # BLD AUTO: 0.4 K/UL — SIGNIFICANT CHANGE UP (ref 0–0.5)
EOSINOPHIL NFR BLD AUTO: 5.9 % — SIGNIFICANT CHANGE UP (ref 0–6)
GLUCOSE BLDC GLUCOMTR-MCNC: 194 MG/DL — HIGH (ref 70–99)
GLUCOSE BLDC GLUCOMTR-MCNC: 207 MG/DL — HIGH (ref 70–99)
GLUCOSE BLDC GLUCOMTR-MCNC: 238 MG/DL — HIGH (ref 70–99)
GLUCOSE BLDC GLUCOMTR-MCNC: 249 MG/DL — HIGH (ref 70–99)
GLUCOSE SERPL-MCNC: 184 MG/DL — HIGH (ref 70–99)
HCT VFR BLD CALC: 31.5 % — LOW (ref 34.5–45)
HGB BLD-MCNC: 10.4 G/DL — LOW (ref 11.5–15.5)
IMM GRANULOCYTES NFR BLD AUTO: 0.6 % — SIGNIFICANT CHANGE UP (ref 0–0.9)
LYMPHOCYTES # BLD AUTO: 2.68 K/UL — SIGNIFICANT CHANGE UP (ref 1–3.3)
LYMPHOCYTES # BLD AUTO: 39.6 % — SIGNIFICANT CHANGE UP (ref 13–44)
MAGNESIUM SERPL-MCNC: 2.2 MG/DL — SIGNIFICANT CHANGE UP (ref 1.6–2.6)
MCHC RBC-ENTMCNC: 28.7 PG — SIGNIFICANT CHANGE UP (ref 27–34)
MCHC RBC-ENTMCNC: 33 G/DL — SIGNIFICANT CHANGE UP (ref 32–36)
MCV RBC AUTO: 87 FL — SIGNIFICANT CHANGE UP (ref 80–100)
MONOCYTES # BLD AUTO: 0.58 K/UL — SIGNIFICANT CHANGE UP (ref 0–0.9)
MONOCYTES NFR BLD AUTO: 8.6 % — SIGNIFICANT CHANGE UP (ref 2–14)
NEUTROPHILS # BLD AUTO: 3.03 K/UL — SIGNIFICANT CHANGE UP (ref 1.8–7.4)
NEUTROPHILS NFR BLD AUTO: 44.7 % — SIGNIFICANT CHANGE UP (ref 43–77)
PHOSPHATE SERPL-MCNC: 3.4 MG/DL — SIGNIFICANT CHANGE UP (ref 2.4–4.7)
PLATELET # BLD AUTO: 189 K/UL — SIGNIFICANT CHANGE UP (ref 150–400)
POTASSIUM SERPL-MCNC: 4.1 MMOL/L — SIGNIFICANT CHANGE UP (ref 3.5–5.3)
POTASSIUM SERPL-SCNC: 4.1 MMOL/L — SIGNIFICANT CHANGE UP (ref 3.5–5.3)
RBC # BLD: 3.62 M/UL — LOW (ref 3.8–5.2)
RBC # FLD: 13.3 % — SIGNIFICANT CHANGE UP (ref 10.3–14.5)
SODIUM SERPL-SCNC: 138 MMOL/L — SIGNIFICANT CHANGE UP (ref 135–145)
WBC # BLD: 6.77 K/UL — SIGNIFICANT CHANGE UP (ref 3.8–10.5)
WBC # FLD AUTO: 6.77 K/UL — SIGNIFICANT CHANGE UP (ref 3.8–10.5)

## 2024-12-22 PROCEDURE — 99231 SBSQ HOSP IP/OBS SF/LOW 25: CPT

## 2024-12-22 RX ORDER — POLYETHYLENE GLYCOL 3350 17 G/DOSE
17 POWDER (GRAM) ORAL DAILY
Refills: 0 | Status: DISCONTINUED | OUTPATIENT
Start: 2024-12-22 | End: 2025-01-10

## 2024-12-22 RX ADMIN — ACETAMINOPHEN 975 MILLIGRAM(S): 80 SOLUTION/ DROPS ORAL at 19:13

## 2024-12-22 RX ADMIN — ACETAMINOPHEN 975 MILLIGRAM(S): 80 SOLUTION/ DROPS ORAL at 22:00

## 2024-12-22 RX ADMIN — KETOROLAC TROMETHAMINE 15 MILLIGRAM(S): 30 INJECTION INTRAMUSCULAR; INTRAVENOUS at 05:19

## 2024-12-22 RX ADMIN — KETOROLAC TROMETHAMINE 15 MILLIGRAM(S): 30 INJECTION INTRAMUSCULAR; INTRAVENOUS at 12:11

## 2024-12-22 RX ADMIN — GABAPENTIN 300 MILLIGRAM(S): 300 CAPSULE ORAL at 12:12

## 2024-12-22 RX ADMIN — Medication 750 MILLIGRAM(S): at 02:26

## 2024-12-22 RX ADMIN — ACETAMINOPHEN 975 MILLIGRAM(S): 80 SOLUTION/ DROPS ORAL at 08:15

## 2024-12-22 RX ADMIN — ACETAMINOPHEN 975 MILLIGRAM(S): 80 SOLUTION/ DROPS ORAL at 03:26

## 2024-12-22 RX ADMIN — Medication 17 GRAM(S): at 18:44

## 2024-12-22 RX ADMIN — KETOROLAC TROMETHAMINE 15 MILLIGRAM(S): 30 INJECTION INTRAMUSCULAR; INTRAVENOUS at 19:13

## 2024-12-22 RX ADMIN — LIDOCAINE 2 PATCH: 50 OINTMENT TOPICAL at 23:52

## 2024-12-22 RX ADMIN — KETOROLAC TROMETHAMINE 15 MILLIGRAM(S): 30 INJECTION INTRAMUSCULAR; INTRAVENOUS at 06:19

## 2024-12-22 RX ADMIN — Medication 750 MILLIGRAM(S): at 15:04

## 2024-12-22 RX ADMIN — KETOROLAC TROMETHAMINE 15 MILLIGRAM(S): 30 INJECTION INTRAMUSCULAR; INTRAVENOUS at 18:44

## 2024-12-22 RX ADMIN — KETOROLAC TROMETHAMINE 15 MILLIGRAM(S): 30 INJECTION INTRAMUSCULAR; INTRAVENOUS at 13:00

## 2024-12-22 RX ADMIN — Medication 750 MILLIGRAM(S): at 08:15

## 2024-12-22 RX ADMIN — Medication 2: at 12:12

## 2024-12-22 RX ADMIN — Medication 2: at 16:46

## 2024-12-22 RX ADMIN — KETOROLAC TROMETHAMINE 15 MILLIGRAM(S): 30 INJECTION INTRAMUSCULAR; INTRAVENOUS at 23:51

## 2024-12-22 RX ADMIN — ACETAMINOPHEN 975 MILLIGRAM(S): 80 SOLUTION/ DROPS ORAL at 11:26

## 2024-12-22 RX ADMIN — ACETAMINOPHEN 975 MILLIGRAM(S): 80 SOLUTION/ DROPS ORAL at 15:04

## 2024-12-22 RX ADMIN — ACETAMINOPHEN 975 MILLIGRAM(S): 80 SOLUTION/ DROPS ORAL at 02:26

## 2024-12-22 RX ADMIN — ACETAMINOPHEN 975 MILLIGRAM(S): 80 SOLUTION/ DROPS ORAL at 21:06

## 2024-12-22 RX ADMIN — Medication 750 MILLIGRAM(S): at 21:07

## 2024-12-22 RX ADMIN — GABAPENTIN 300 MILLIGRAM(S): 300 CAPSULE ORAL at 21:09

## 2024-12-22 RX ADMIN — ENOXAPARIN SODIUM 40 MILLIGRAM(S): 60 INJECTION INTRAVENOUS; SUBCUTANEOUS at 05:18

## 2024-12-22 RX ADMIN — GABAPENTIN 300 MILLIGRAM(S): 300 CAPSULE ORAL at 05:18

## 2024-12-22 RX ADMIN — Medication 2: at 08:15
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
## 2024-12-22 NOTE — PROGRESS NOTE ADULT - NS ATTEND AMEND GEN_ALL_CORE FT
Above assessment noted.  The patient was seen and examined by myself with the surgical PA.  The patient is without new events or complaints overnight.  The patient remains trauma stable for discharge planning.

## 2024-12-22 NOTE — PROGRESS NOTE ADULT - SUBJECTIVE AND OBJECTIVE BOX
Pt Name: YUNI HOLLOWAY    MRN: [id number]    Patient is a being followed for I&D with ORIF of right ankle, ORIF right distal radius, ORIF left ulna POD#3. Patient reports her pain is controlled at this time. Reports some tingling to right hand digits 1-3, not worsening. Patient has no other orthopedic complaints at this time.     PAST MEDICAL & SURGICAL HISTORY:  PAST MEDICAL & SURGICAL HISTORY:    Allergies: morphine (Short breath)    Medications: acetaminophen     Tablet .. 975 milliGRAM(s) Oral every 6 hours  dextrose 5%. 1000 milliLiter(s) IV Continuous <Continuous>  dextrose 5%. 1000 milliLiter(s) IV Continuous <Continuous>  dextrose 50% Injectable 25 Gram(s) IV Push once  dextrose 50% Injectable 12.5 Gram(s) IV Push once  dextrose 50% Injectable 25 Gram(s) IV Push once  dextrose Oral Gel 15 Gram(s) Oral once PRN  enoxaparin Injectable 40 milliGRAM(s) SubCutaneous every 24 hours  gabapentin 300 milliGRAM(s) Oral every 8 hours  glucagon  Injectable 1 milliGRAM(s) IntraMuscular once  influenza   Vaccine 0.5 milliLiter(s) IntraMuscular once  insulin lispro (ADMELOG) corrective regimen sliding scale   SubCutaneous three times a day before meals  ketorolac   Injectable 15 milliGRAM(s) IV Push every 6 hours  lidocaine   4% Patch 2 Patch Transdermal every 24 hours  methocarbamol 750 milliGRAM(s) Oral every 6 hours  vancomycin  IVPB 1250 milliGRAM(s) IV Intermittent once                          10.4   6.77  )-----------( 189      ( 22 Dec 2024 05:51 )             31.5     12-22    138  |  104  |  13.9  ----------------------------<  184[H]  4.1   |  23.0  |  0.49[L]    Ca    8.7      22 Dec 2024 05:51  Phos  3.4     12-22  Mg     2.2     12-22        PHYSICAL EXAM:    Vital Signs Last 24 Hrs  T(C): 36.8 (22 Dec 2024 04:40), Max: 37.1 (22 Dec 2024 00:15)  T(F): 98.2 (22 Dec 2024 04:40), Max: 98.7 (22 Dec 2024 00:15)  HR: 77 (22 Dec 2024 04:40) (65 - 79)  BP: 132/71 (22 Dec 2024 04:40) (118/67 - 147/70)  BP(mean): --  RR: 18 (22 Dec 2024 04:40) (18 - 18)  SpO2: 93% (22 Dec 2024 04:40) (92% - 94%)    Parameters below as of 22 Dec 2024 04:40  Patient On (Oxygen Delivery Method): room air      Daily     Daily     Appearance: Alert, responsive, in no acute distress.  RLE: right ankle splint c/d/i, +ROM toes, SILT, BCR, no cyanosis, no other deformities or bony tenderness, visible skin intact  RUE: right wrist splint c/d/i, +ROM digits intact, +subjective paresthesias to digits 1-3, otherwise SILT BCR, no cyanosis, no other deformities or bony tenderness, visible skin intact  LUE: left forearm ACE c/d/i, +ROM digits intact, SILT, BCR, no cyanosis, no other deformities or bony tenderness, visible skin intact    A/P:  Pt is a  54y Female with I&D with ORIF of right ankle, ORIF right distal radius, ORIF left ulna POD#3    PLAN:   * NWB right ankle (may weight bear thru knee)  * NWB right wrist and left wrist (may weight bear thru both elbows)  * DVTP  * Pain Control  * PT/OT  * cont. care per primary team

## 2024-12-23 LAB
GLUCOSE BLDC GLUCOMTR-MCNC: 177 MG/DL — HIGH (ref 70–99)
GLUCOSE BLDC GLUCOMTR-MCNC: 199 MG/DL — HIGH (ref 70–99)
GLUCOSE BLDC GLUCOMTR-MCNC: 209 MG/DL — HIGH (ref 70–99)
GLUCOSE BLDC GLUCOMTR-MCNC: 210 MG/DL — HIGH (ref 70–99)

## 2024-12-23 PROCEDURE — 99232 SBSQ HOSP IP/OBS MODERATE 35: CPT

## 2024-12-23 RX ADMIN — ENOXAPARIN SODIUM 40 MILLIGRAM(S): 60 INJECTION INTRAVENOUS; SUBCUTANEOUS at 05:30

## 2024-12-23 RX ADMIN — KETOROLAC TROMETHAMINE 15 MILLIGRAM(S): 30 INJECTION INTRAMUSCULAR; INTRAVENOUS at 00:56

## 2024-12-23 RX ADMIN — KETOROLAC TROMETHAMINE 15 MILLIGRAM(S): 30 INJECTION INTRAMUSCULAR; INTRAVENOUS at 14:08

## 2024-12-23 RX ADMIN — Medication 2: at 18:35

## 2024-12-23 RX ADMIN — KETOROLAC TROMETHAMINE 15 MILLIGRAM(S): 30 INJECTION INTRAMUSCULAR; INTRAVENOUS at 18:36

## 2024-12-23 RX ADMIN — Medication 750 MILLIGRAM(S): at 21:18

## 2024-12-23 RX ADMIN — GABAPENTIN 300 MILLIGRAM(S): 300 CAPSULE ORAL at 13:10

## 2024-12-23 RX ADMIN — LIDOCAINE 2 PATCH: 50 OINTMENT TOPICAL at 23:48

## 2024-12-23 RX ADMIN — ACETAMINOPHEN 975 MILLIGRAM(S): 80 SOLUTION/ DROPS ORAL at 02:46

## 2024-12-23 RX ADMIN — Medication 750 MILLIGRAM(S): at 02:46

## 2024-12-23 RX ADMIN — ACETAMINOPHEN 975 MILLIGRAM(S): 80 SOLUTION/ DROPS ORAL at 17:22

## 2024-12-23 RX ADMIN — Medication 2: at 13:40

## 2024-12-23 RX ADMIN — ACETAMINOPHEN 975 MILLIGRAM(S): 80 SOLUTION/ DROPS ORAL at 22:18

## 2024-12-23 RX ADMIN — ACETAMINOPHEN 975 MILLIGRAM(S): 80 SOLUTION/ DROPS ORAL at 03:45

## 2024-12-23 RX ADMIN — LIDOCAINE 2 PATCH: 50 OINTMENT TOPICAL at 07:30

## 2024-12-23 RX ADMIN — ACETAMINOPHEN 975 MILLIGRAM(S): 80 SOLUTION/ DROPS ORAL at 09:41

## 2024-12-23 RX ADMIN — ACETAMINOPHEN 975 MILLIGRAM(S): 80 SOLUTION/ DROPS ORAL at 08:41

## 2024-12-23 RX ADMIN — ACETAMINOPHEN 975 MILLIGRAM(S): 80 SOLUTION/ DROPS ORAL at 21:18

## 2024-12-23 RX ADMIN — Medication 17 GRAM(S): at 16:22

## 2024-12-23 RX ADMIN — Medication 1: at 08:52

## 2024-12-23 RX ADMIN — Medication 750 MILLIGRAM(S): at 16:22

## 2024-12-23 RX ADMIN — Medication 750 MILLIGRAM(S): at 08:41

## 2024-12-23 RX ADMIN — KETOROLAC TROMETHAMINE 15 MILLIGRAM(S): 30 INJECTION INTRAMUSCULAR; INTRAVENOUS at 06:21

## 2024-12-23 RX ADMIN — KETOROLAC TROMETHAMINE 15 MILLIGRAM(S): 30 INJECTION INTRAMUSCULAR; INTRAVENOUS at 13:08

## 2024-12-23 RX ADMIN — GABAPENTIN 300 MILLIGRAM(S): 300 CAPSULE ORAL at 05:30

## 2024-12-23 RX ADMIN — KETOROLAC TROMETHAMINE 15 MILLIGRAM(S): 30 INJECTION INTRAMUSCULAR; INTRAVENOUS at 05:30

## 2024-12-23 RX ADMIN — GABAPENTIN 300 MILLIGRAM(S): 300 CAPSULE ORAL at 21:18

## 2024-12-23 RX ADMIN — KETOROLAC TROMETHAMINE 15 MILLIGRAM(S): 30 INJECTION INTRAMUSCULAR; INTRAVENOUS at 19:36

## 2024-12-23 RX ADMIN — ACETAMINOPHEN 975 MILLIGRAM(S): 80 SOLUTION/ DROPS ORAL at 16:22

## 2024-12-23 RX ADMIN — LIDOCAINE 2 PATCH: 50 OINTMENT TOPICAL at 11:00

## 2024-12-23 RX ADMIN — KETOROLAC TROMETHAMINE 15 MILLIGRAM(S): 30 INJECTION INTRAMUSCULAR; INTRAVENOUS at 23:49
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
## 2024-12-23 NOTE — OCCUPATIONAL THERAPY INITIAL EVALUATION ADULT - GENERAL OBSERVATIONS, REHAB EVAL
Left pt as received OOB in chair, NAD, +IV lock, +BUE ace wrap, +R LE ace wrap on RA A&Ox4. Pre/post pain 6/10, RUE; RN aware, pain meds on board. Pt agreeable to OT evaluation
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
## 2024-12-23 NOTE — PROGRESS NOTE ADULT - SUBJECTIVE AND OBJECTIVE BOX
INTERVAL HPI/OVERNIGHT EVENTS:    Patient evaluated at bedside. No acute distress. No acute events overnight.    MEDICATIONS  (STANDING):  acetaminophen     Tablet .. 975 milliGRAM(s) Oral every 6 hours  dextrose 5%. 1000 milliLiter(s) (100 mL/Hr) IV Continuous <Continuous>  dextrose 5%. 1000 milliLiter(s) (50 mL/Hr) IV Continuous <Continuous>  dextrose 50% Injectable 25 Gram(s) IV Push once  dextrose 50% Injectable 12.5 Gram(s) IV Push once  dextrose 50% Injectable 25 Gram(s) IV Push once  enoxaparin Injectable 40 milliGRAM(s) SubCutaneous every 24 hours  gabapentin 300 milliGRAM(s) Oral every 8 hours  glucagon  Injectable 1 milliGRAM(s) IntraMuscular once  influenza   Vaccine 0.5 milliLiter(s) IntraMuscular once  insulin lispro (ADMELOG) corrective regimen sliding scale   SubCutaneous three times a day before meals  ketorolac   Injectable 15 milliGRAM(s) IV Push every 6 hours  lidocaine   4% Patch 2 Patch Transdermal every 24 hours  methocarbamol 750 milliGRAM(s) Oral every 6 hours  polyethylene glycol 3350 17 Gram(s) Oral daily  vancomycin  IVPB 1250 milliGRAM(s) IV Intermittent once    MEDICATIONS  (PRN):  dextrose Oral Gel 15 Gram(s) Oral once PRN Blood Glucose LESS THAN 70 milliGRAM(s)/deciliter      Vital Signs Last 24 Hrs  T(C): 36.6 (23 Dec 2024 08:20), Max: 36.8 (22 Dec 2024 16:04)  T(F): 97.8 (23 Dec 2024 08:20), Max: 98.3 (22 Dec 2024 16:04)  HR: 60 (23 Dec 2024 08:20) (57 - 73)  BP: 153/84 (23 Dec 2024 08:20) (119/68 - 153/84)  BP(mean): --  RR: 18 (23 Dec 2024 08:20) (16 - 18)  SpO2: 93% (23 Dec 2024 08:20) (92% - 96%)    Parameters below as of 23 Dec 2024 08:20  Patient On (Oxygen Delivery Method): room air        Physical Exam:  Constitutional: NAD  HEENT: PERRL, EOMI  Respiratory: Respirations non-labored, no accessory muscle use  Extremities: b/l UE splints and RLE splints in place      I&O's Detail    22 Dec 2024 07:01  -  23 Dec 2024 07:00  --------------------------------------------------------  IN:    Oral Fluid: 480 mL  Total IN: 480 mL    OUT:    Voided (mL): 1000 mL  Total OUT: 1000 mL    Total NET: -520 mL      23 Dec 2024 07:01  -  23 Dec 2024 12:22  --------------------------------------------------------  IN:  Total IN: 0 mL    OUT:    Voided (mL): 400 mL  Total OUT: 400 mL    Total NET: -400 mL          LABS:                        10.4   6.77  )-----------( 189      ( 22 Dec 2024 05:51 )             31.5     12-22    138  |  104  |  13.9  ----------------------------<  184[H]  4.1   |  23.0  |  0.49[L]    Ca    8.7      22 Dec 2024 05:51  Phos  3.4     12-22  Mg     2.2     12-22        Urinalysis Basic - ( 22 Dec 2024 05:51 )    Color: x / Appearance: x / SG: x / pH: x  Gluc: 184 mg/dL / Ketone: x  / Bili: x / Urobili: x   Blood: x / Protein: x / Nitrite: x   Leuk Esterase: x / RBC: x / WBC x   Sq Epi: x / Non Sq Epi: x / Bacteria: x        RADIOLOGY & ADDITIONAL STUDIES:
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
## 2024-12-24 LAB
GLUCOSE BLDC GLUCOMTR-MCNC: 158 MG/DL — HIGH (ref 70–99)
GLUCOSE BLDC GLUCOMTR-MCNC: 258 MG/DL — HIGH (ref 70–99)
GLUCOSE BLDC GLUCOMTR-MCNC: 285 MG/DL — HIGH (ref 70–99)

## 2024-12-24 PROCEDURE — 99232 SBSQ HOSP IP/OBS MODERATE 35: CPT

## 2024-12-24 RX ADMIN — ACETAMINOPHEN 975 MILLIGRAM(S): 80 SOLUTION/ DROPS ORAL at 21:37

## 2024-12-24 RX ADMIN — Medication 750 MILLIGRAM(S): at 02:48

## 2024-12-24 RX ADMIN — KETOROLAC TROMETHAMINE 15 MILLIGRAM(S): 30 INJECTION INTRAMUSCULAR; INTRAVENOUS at 18:04

## 2024-12-24 RX ADMIN — Medication 17 GRAM(S): at 13:34

## 2024-12-24 RX ADMIN — GABAPENTIN 300 MILLIGRAM(S): 300 CAPSULE ORAL at 21:37

## 2024-12-24 RX ADMIN — Medication 750 MILLIGRAM(S): at 21:37

## 2024-12-24 RX ADMIN — ENOXAPARIN SODIUM 40 MILLIGRAM(S): 60 INJECTION INTRAVENOUS; SUBCUTANEOUS at 05:06

## 2024-12-24 RX ADMIN — ACETAMINOPHEN 975 MILLIGRAM(S): 80 SOLUTION/ DROPS ORAL at 09:39

## 2024-12-24 RX ADMIN — Medication 1: at 09:39

## 2024-12-24 RX ADMIN — Medication 750 MILLIGRAM(S): at 13:34

## 2024-12-24 RX ADMIN — ACETAMINOPHEN 975 MILLIGRAM(S): 80 SOLUTION/ DROPS ORAL at 09:00

## 2024-12-24 RX ADMIN — KETOROLAC TROMETHAMINE 15 MILLIGRAM(S): 30 INJECTION INTRAMUSCULAR; INTRAVENOUS at 00:49

## 2024-12-24 RX ADMIN — KETOROLAC TROMETHAMINE 15 MILLIGRAM(S): 30 INJECTION INTRAMUSCULAR; INTRAVENOUS at 06:42

## 2024-12-24 RX ADMIN — Medication 750 MILLIGRAM(S): at 09:38

## 2024-12-24 RX ADMIN — ACETAMINOPHEN 975 MILLIGRAM(S): 80 SOLUTION/ DROPS ORAL at 02:48

## 2024-12-24 RX ADMIN — ACETAMINOPHEN 975 MILLIGRAM(S): 80 SOLUTION/ DROPS ORAL at 22:30

## 2024-12-24 RX ADMIN — ACETAMINOPHEN 975 MILLIGRAM(S): 80 SOLUTION/ DROPS ORAL at 03:48

## 2024-12-24 RX ADMIN — Medication 3: at 18:03

## 2024-12-24 RX ADMIN — KETOROLAC TROMETHAMINE 15 MILLIGRAM(S): 30 INJECTION INTRAMUSCULAR; INTRAVENOUS at 14:04

## 2024-12-24 RX ADMIN — KETOROLAC TROMETHAMINE 15 MILLIGRAM(S): 30 INJECTION INTRAMUSCULAR; INTRAVENOUS at 19:29

## 2024-12-24 RX ADMIN — Medication 3: at 13:35

## 2024-12-24 RX ADMIN — GABAPENTIN 300 MILLIGRAM(S): 300 CAPSULE ORAL at 05:06

## 2024-12-24 RX ADMIN — GABAPENTIN 300 MILLIGRAM(S): 300 CAPSULE ORAL at 13:34

## 2024-12-24 RX ADMIN — KETOROLAC TROMETHAMINE 15 MILLIGRAM(S): 30 INJECTION INTRAMUSCULAR; INTRAVENOUS at 13:34

## 2024-12-24 RX ADMIN — ACETAMINOPHEN 975 MILLIGRAM(S): 80 SOLUTION/ DROPS ORAL at 13:34

## 2024-12-24 RX ADMIN — ACETAMINOPHEN 975 MILLIGRAM(S): 80 SOLUTION/ DROPS ORAL at 14:03

## 2024-12-24 NOTE — PROGRESS NOTE ADULT - SUBJECTIVE AND OBJECTIVE BOX
24H: No new events, patient is working with PT, on going dispo planning discussed with case management.     Vital Signs Last 24 Hrs  T(C): 37.1 (24 Dec 2024 09:12), Max: 37.1 (24 Dec 2024 09:12)  T(F): 98.8 (24 Dec 2024 09:12), Max: 98.8 (24 Dec 2024 09:12)  HR: 64 (24 Dec 2024 09:12) (57 - 78)  BP: 136/78 (24 Dec 2024 09:12) (119/66 - 158/81)  BP(mean): --  RR: 18 (24 Dec 2024 09:12) (18 - 18)  SpO2: 93% (24 Dec 2024 09:12) (92% - 96%)    Parameters below as of 24 Dec 2024 09:12  Patient On (Oxygen Delivery Method): room air    MEDICATIONS  (STANDING):  acetaminophen     Tablet .. 975 milliGRAM(s) Oral every 6 hours  dextrose 5%. 1000 milliLiter(s) (50 mL/Hr) IV Continuous <Continuous>  dextrose 5%. 1000 milliLiter(s) (100 mL/Hr) IV Continuous <Continuous>  dextrose 50% Injectable 25 Gram(s) IV Push once  dextrose 50% Injectable 12.5 Gram(s) IV Push once  dextrose 50% Injectable 25 Gram(s) IV Push once  enoxaparin Injectable 40 milliGRAM(s) SubCutaneous every 24 hours  gabapentin 300 milliGRAM(s) Oral every 8 hours  glucagon  Injectable 1 milliGRAM(s) IntraMuscular once  influenza   Vaccine 0.5 milliLiter(s) IntraMuscular once  insulin lispro (ADMELOG) corrective regimen sliding scale   SubCutaneous three times a day before meals  ketorolac   Injectable 15 milliGRAM(s) IV Push every 6 hours  lidocaine   4% Patch 2 Patch Transdermal every 24 hours  methocarbamol 750 milliGRAM(s) Oral every 6 hours  polyethylene glycol 3350 17 Gram(s) Oral daily  vancomycin  IVPB 1250 milliGRAM(s) IV Intermittent once    MEDICATIONS  (PRN):  dextrose Oral Gel 15 Gram(s) Oral once PRN Blood Glucose LESS THAN 70 milliGRAM(s)/deciliter   24H: No new events, patient is working with PT, on going dispo planning discussed with case management.     Vital Signs Last 24 Hrs  T(C): 37.1 (24 Dec 2024 09:12), Max: 37.1 (24 Dec 2024 09:12)  T(F): 98.8 (24 Dec 2024 09:12), Max: 98.8 (24 Dec 2024 09:12)  HR: 64 (24 Dec 2024 09:12) (57 - 78)  BP: 136/78 (24 Dec 2024 09:12) (119/66 - 158/81)  BP(mean): --  RR: 18 (24 Dec 2024 09:12) (18 - 18)  SpO2: 93% (24 Dec 2024 09:12) (92% - 96%)    Parameters below as of 24 Dec 2024 09:12  Patient On (Oxygen Delivery Method): room air    MEDICATIONS  (STANDING):  acetaminophen     Tablet .. 975 milliGRAM(s) Oral every 6 hours  dextrose 5%. 1000 milliLiter(s) (50 mL/Hr) IV Continuous <Continuous>  dextrose 5%. 1000 milliLiter(s) (100 mL/Hr) IV Continuous <Continuous>  dextrose 50% Injectable 25 Gram(s) IV Push once  dextrose 50% Injectable 12.5 Gram(s) IV Push once  dextrose 50% Injectable 25 Gram(s) IV Push once  enoxaparin Injectable 40 milliGRAM(s) SubCutaneous every 24 hours  gabapentin 300 milliGRAM(s) Oral every 8 hours  glucagon  Injectable 1 milliGRAM(s) IntraMuscular once  influenza   Vaccine 0.5 milliLiter(s) IntraMuscular once  insulin lispro (ADMELOG) corrective regimen sliding scale   SubCutaneous three times a day before meals  ketorolac   Injectable 15 milliGRAM(s) IV Push every 6 hours  lidocaine   4% Patch 2 Patch Transdermal every 24 hours  methocarbamol 750 milliGRAM(s) Oral every 6 hours  polyethylene glycol 3350 17 Gram(s) Oral daily  vancomycin  IVPB 1250 milliGRAM(s) IV Intermittent once    MEDICATIONS  (PRN):  dextrose Oral Gel 15 Gram(s) Oral once PRN Blood Glucose LESS THAN 70 milliGRAM(s)/deciliter    PHYSICAL EXAM:      Constitutional: alert, nad    Eyes: EOMI    Respiratory: normal respiratory effort    Gastrointestinal: soft nt nd    Neurological: A&O x3, non focal    Skin: warm, dry, normal cap refill    Musculoskeletal: B/l UE splinted, LLE splinted    Psychiatric: normal affect

## 2024-12-24 NOTE — PROGRESS NOTE ADULT - NS ATTEND AMEND GEN_ALL_CORE FT
Steff is recovering well, tolerating diet, pain controlled  PT/OT  DVT prophylaxis   ortho f/u appreciated  pain control  pending SUZANNA     Norm Stauffer MD FACS  Acute and Critical Care Surgery  Director of Emergency General Surgery @ [facility]  Associate  - General Surgery @ [facility] Steff is recovering well, tolerating diet, pain controlled  PT/OT  DVT prophylaxis   ortho f/u appreciated  -BG < 180  pain control  pending SUZANNA     Norm Stauffer MD FACS  Acute and Critical Care Surgery  Director of Emergency General Surgery @ [facility]  Associate  - General Surgery @ [facility]

## 2024-12-25 LAB
GLUCOSE BLDC GLUCOMTR-MCNC: 199 MG/DL — HIGH (ref 70–99)
GLUCOSE BLDC GLUCOMTR-MCNC: 211 MG/DL — HIGH (ref 70–99)
GLUCOSE BLDC GLUCOMTR-MCNC: 211 MG/DL — HIGH (ref 70–99)
GLUCOSE BLDC GLUCOMTR-MCNC: 227 MG/DL — HIGH (ref 70–99)
GLUCOSE BLDC GLUCOMTR-MCNC: 278 MG/DL — HIGH (ref 70–99)

## 2024-12-25 PROCEDURE — 99232 SBSQ HOSP IP/OBS MODERATE 35: CPT

## 2024-12-25 RX ORDER — SODIUM CHLORIDE 9 MG/ML
1000 INJECTION, SOLUTION INTRAVENOUS
Refills: 0 | Status: DISCONTINUED | OUTPATIENT
Start: 2024-12-25 | End: 2025-01-10

## 2024-12-25 RX ORDER — GLUCAGON INJECTION, SOLUTION 0.5 MG/.1ML
1 INJECTION, SOLUTION SUBCUTANEOUS ONCE
Refills: 0 | Status: DISCONTINUED | OUTPATIENT
Start: 2024-12-25 | End: 2025-01-10

## 2024-12-25 RX ORDER — INSULIN LISPRO 100/ML
VIAL (ML) SUBCUTANEOUS
Refills: 0 | Status: DISCONTINUED | OUTPATIENT
Start: 2024-12-25 | End: 2024-12-27

## 2024-12-25 RX ORDER — DEXTROSE MONOHYDRATE 25 G/50ML
15 INJECTION, SOLUTION INTRAVENOUS ONCE
Refills: 0 | Status: DISCONTINUED | OUTPATIENT
Start: 2024-12-25 | End: 2025-01-10

## 2024-12-25 RX ORDER — DEXTROSE MONOHYDRATE 25 G/50ML
25 INJECTION, SOLUTION INTRAVENOUS ONCE
Refills: 0 | Status: DISCONTINUED | OUTPATIENT
Start: 2024-12-25 | End: 2025-01-10

## 2024-12-25 RX ORDER — DEXTROSE MONOHYDRATE 25 G/50ML
12.5 INJECTION, SOLUTION INTRAVENOUS ONCE
Refills: 0 | Status: DISCONTINUED | OUTPATIENT
Start: 2024-12-25 | End: 2025-01-10

## 2024-12-25 RX ADMIN — ENOXAPARIN SODIUM 40 MILLIGRAM(S): 60 INJECTION INTRAVENOUS; SUBCUTANEOUS at 04:21

## 2024-12-25 RX ADMIN — Medication 750 MILLIGRAM(S): at 14:10

## 2024-12-25 RX ADMIN — KETOROLAC TROMETHAMINE 15 MILLIGRAM(S): 30 INJECTION INTRAMUSCULAR; INTRAVENOUS at 20:44

## 2024-12-25 RX ADMIN — ACETAMINOPHEN 975 MILLIGRAM(S): 80 SOLUTION/ DROPS ORAL at 20:42

## 2024-12-25 RX ADMIN — Medication 2: at 11:45

## 2024-12-25 RX ADMIN — LIDOCAINE 2 PATCH: 50 OINTMENT TOPICAL at 14:53

## 2024-12-25 RX ADMIN — LIDOCAINE 2 PATCH: 50 OINTMENT TOPICAL at 12:53

## 2024-12-25 RX ADMIN — GABAPENTIN 300 MILLIGRAM(S): 300 CAPSULE ORAL at 21:20

## 2024-12-25 RX ADMIN — KETOROLAC TROMETHAMINE 15 MILLIGRAM(S): 30 INJECTION INTRAMUSCULAR; INTRAVENOUS at 15:02

## 2024-12-25 RX ADMIN — Medication 6: at 21:30

## 2024-12-25 RX ADMIN — KETOROLAC TROMETHAMINE 15 MILLIGRAM(S): 30 INJECTION INTRAMUSCULAR; INTRAVENOUS at 07:49

## 2024-12-25 RX ADMIN — ACETAMINOPHEN 975 MILLIGRAM(S): 80 SOLUTION/ DROPS ORAL at 05:15

## 2024-12-25 RX ADMIN — Medication 750 MILLIGRAM(S): at 11:53

## 2024-12-25 RX ADMIN — ACETAMINOPHEN 975 MILLIGRAM(S): 80 SOLUTION/ DROPS ORAL at 12:52

## 2024-12-25 RX ADMIN — GABAPENTIN 300 MILLIGRAM(S): 300 CAPSULE ORAL at 14:07

## 2024-12-25 RX ADMIN — ACETAMINOPHEN 975 MILLIGRAM(S): 80 SOLUTION/ DROPS ORAL at 11:52

## 2024-12-25 RX ADMIN — KETOROLAC TROMETHAMINE 15 MILLIGRAM(S): 30 INJECTION INTRAMUSCULAR; INTRAVENOUS at 00:15

## 2024-12-25 RX ADMIN — Medication 750 MILLIGRAM(S): at 21:30

## 2024-12-25 RX ADMIN — ACETAMINOPHEN 975 MILLIGRAM(S): 80 SOLUTION/ DROPS ORAL at 04:21

## 2024-12-25 RX ADMIN — ACETAMINOPHEN 975 MILLIGRAM(S): 80 SOLUTION/ DROPS ORAL at 21:40

## 2024-12-25 RX ADMIN — Medication 2: at 08:16

## 2024-12-25 RX ADMIN — GABAPENTIN 300 MILLIGRAM(S): 300 CAPSULE ORAL at 04:44

## 2024-12-25 RX ADMIN — LIDOCAINE 2 PATCH: 50 OINTMENT TOPICAL at 00:17

## 2024-12-25 RX ADMIN — LIDOCAINE 2 PATCH: 50 OINTMENT TOPICAL at 23:58

## 2024-12-25 RX ADMIN — KETOROLAC TROMETHAMINE 15 MILLIGRAM(S): 30 INJECTION INTRAMUSCULAR; INTRAVENOUS at 21:40

## 2024-12-25 RX ADMIN — Medication 1: at 16:41

## 2024-12-25 RX ADMIN — Medication 750 MILLIGRAM(S): at 04:21

## 2024-12-25 RX ADMIN — Medication 17 GRAM(S): at 11:52

## 2024-12-25 RX ADMIN — KETOROLAC TROMETHAMINE 15 MILLIGRAM(S): 30 INJECTION INTRAMUSCULAR; INTRAVENOUS at 01:00

## 2024-12-25 RX ADMIN — KETOROLAC TROMETHAMINE 15 MILLIGRAM(S): 30 INJECTION INTRAMUSCULAR; INTRAVENOUS at 14:08

## 2024-12-25 RX ADMIN — KETOROLAC TROMETHAMINE 15 MILLIGRAM(S): 30 INJECTION INTRAMUSCULAR; INTRAVENOUS at 08:40

## 2024-12-25 NOTE — PROGRESS NOTE ADULT - SUBJECTIVE AND OBJECTIVE BOX
SUBJECTIVE / 24H EVENTS:    Pt seen and examined at bedside by the team during rounds. Pt found to be resting in bed comfortably with no new acute complaints at the time of examination. Pt denies CP, SOB, N/V, fever, chills.     MEDICATIONS  (STANDING):  acetaminophen     Tablet .. 975 milliGRAM(s) Oral every 6 hours  dextrose 5%. 1000 milliLiter(s) (50 mL/Hr) IV Continuous <Continuous>  dextrose 5%. 1000 milliLiter(s) (100 mL/Hr) IV Continuous <Continuous>  dextrose 50% Injectable 25 Gram(s) IV Push once  dextrose 50% Injectable 12.5 Gram(s) IV Push once  dextrose 50% Injectable 25 Gram(s) IV Push once  enoxaparin Injectable 40 milliGRAM(s) SubCutaneous every 24 hours  gabapentin 300 milliGRAM(s) Oral every 8 hours  glucagon  Injectable 1 milliGRAM(s) IntraMuscular once  influenza   Vaccine 0.5 milliLiter(s) IntraMuscular once  insulin lispro (ADMELOG) corrective regimen sliding scale   SubCutaneous three times a day before meals  ketorolac   Injectable 15 milliGRAM(s) IV Push every 6 hours  lidocaine   4% Patch 2 Patch Transdermal every 24 hours  methocarbamol 750 milliGRAM(s) Oral every 6 hours  polyethylene glycol 3350 17 Gram(s) Oral daily  vancomycin  IVPB 1250 milliGRAM(s) IV Intermittent once    MEDICATIONS  (PRN):  dextrose Oral Gel 15 Gram(s) Oral once PRN Blood Glucose LESS THAN 70 milliGRAM(s)/deciliter      Vital Signs Last 24 Hrs  T(C): 36.8 (24 Dec 2024 23:45), Max: 37.1 (24 Dec 2024 09:12)  T(F): 98.3 (24 Dec 2024 23:45), Max: 98.8 (24 Dec 2024 09:12)  HR: 62 (24 Dec 2024 23:45) (57 - 73)  BP: 136/76 (24 Dec 2024 23:45) (116/62 - 143/75)  BP(mean): --  RR: 18 (24 Dec 2024 23:45) (18 - 18)  SpO2: 93% (24 Dec 2024 23:45) (93% - 95%)    Parameters below as of 24 Dec 2024 23:45  Patient On (Oxygen Delivery Method): room air      Physical Exam  Constitutional: patient appears comfortable resting in bed, in no apparent distress  Respiratory: respirations are unlabored, no accessory muscle use, no conversational dyspnea  Cardiovascular: regular rate & rhythm  Gastrointestinal: abdomen is soft & non-distended, non-tender, no rebound tenderness / guarding  Musculoskeletal: B/l UE splinted, LLE splinted  Skin: mucous membranes moist, no diaphoresis, pallor, cyanosis or jaundice      I&O's Detail    23 Dec 2024 07:01  -  24 Dec 2024 07:00  --------------------------------------------------------  IN:  Total IN: 0 mL    OUT:    Voided (mL): 400 mL  Total OUT: 400 mL    Total NET: -400 mL          LABS:

## 2024-12-26 LAB
GLUCOSE BLDC GLUCOMTR-MCNC: 208 MG/DL — HIGH (ref 70–99)
GLUCOSE BLDC GLUCOMTR-MCNC: 215 MG/DL — HIGH (ref 70–99)
GLUCOSE BLDC GLUCOMTR-MCNC: 217 MG/DL — HIGH (ref 70–99)
GLUCOSE BLDC GLUCOMTR-MCNC: 266 MG/DL — HIGH (ref 70–99)

## 2024-12-26 PROCEDURE — 99232 SBSQ HOSP IP/OBS MODERATE 35: CPT

## 2024-12-26 RX ORDER — INSULIN GLARGINE-YFGN 100 [IU]/ML
5 INJECTION, SOLUTION SUBCUTANEOUS AT BEDTIME
Refills: 0 | Status: DISCONTINUED | OUTPATIENT
Start: 2024-12-26 | End: 2025-01-10

## 2024-12-26 RX ADMIN — INSULIN GLARGINE-YFGN 5 UNIT(S): 100 INJECTION, SOLUTION SUBCUTANEOUS at 22:19

## 2024-12-26 RX ADMIN — Medication 750 MILLIGRAM(S): at 11:14

## 2024-12-26 RX ADMIN — Medication 750 MILLIGRAM(S): at 17:18

## 2024-12-26 RX ADMIN — ACETAMINOPHEN 975 MILLIGRAM(S): 80 SOLUTION/ DROPS ORAL at 18:18

## 2024-12-26 RX ADMIN — GABAPENTIN 300 MILLIGRAM(S): 300 CAPSULE ORAL at 21:19

## 2024-12-26 RX ADMIN — LIDOCAINE 2 PATCH: 50 OINTMENT TOPICAL at 08:35

## 2024-12-26 RX ADMIN — LIDOCAINE 2 PATCH: 50 OINTMENT TOPICAL at 11:00

## 2024-12-26 RX ADMIN — Medication 4: at 12:18

## 2024-12-26 RX ADMIN — ACETAMINOPHEN 975 MILLIGRAM(S): 80 SOLUTION/ DROPS ORAL at 05:45

## 2024-12-26 RX ADMIN — Medication 6: at 21:42

## 2024-12-26 RX ADMIN — Medication 4: at 16:31

## 2024-12-26 RX ADMIN — GABAPENTIN 300 MILLIGRAM(S): 300 CAPSULE ORAL at 11:14

## 2024-12-26 RX ADMIN — GABAPENTIN 300 MILLIGRAM(S): 300 CAPSULE ORAL at 04:57

## 2024-12-26 RX ADMIN — ACETAMINOPHEN 975 MILLIGRAM(S): 80 SOLUTION/ DROPS ORAL at 17:18

## 2024-12-26 RX ADMIN — ACETAMINOPHEN 975 MILLIGRAM(S): 80 SOLUTION/ DROPS ORAL at 11:13

## 2024-12-26 RX ADMIN — LIDOCAINE 2 PATCH: 50 OINTMENT TOPICAL at 22:19

## 2024-12-26 RX ADMIN — Medication 4: at 08:01

## 2024-12-26 RX ADMIN — ACETAMINOPHEN 975 MILLIGRAM(S): 80 SOLUTION/ DROPS ORAL at 22:19

## 2024-12-26 RX ADMIN — ACETAMINOPHEN 975 MILLIGRAM(S): 80 SOLUTION/ DROPS ORAL at 22:41

## 2024-12-26 RX ADMIN — Medication 750 MILLIGRAM(S): at 22:19

## 2024-12-26 RX ADMIN — ENOXAPARIN SODIUM 40 MILLIGRAM(S): 60 INJECTION INTRAVENOUS; SUBCUTANEOUS at 04:58

## 2024-12-26 RX ADMIN — ACETAMINOPHEN 975 MILLIGRAM(S): 80 SOLUTION/ DROPS ORAL at 12:13

## 2024-12-26 RX ADMIN — Medication 750 MILLIGRAM(S): at 04:58

## 2024-12-26 RX ADMIN — ACETAMINOPHEN 975 MILLIGRAM(S): 80 SOLUTION/ DROPS ORAL at 04:57

## 2024-12-26 NOTE — ADVANCED PRACTICE NURSE CONSULT - RECOMMEDATIONS
after chart review pls consider lantus 10 units qhs in order to improve glycemic control  pt to inject all insulin doses while in the hospital using prefilled insulin syringe and rn supervision  insulin pen teaching including pen setting testing dosing and injection  bg monitoring teaching
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
## 2024-12-26 NOTE — PROGRESS NOTE ADULT - SUBJECTIVE AND OBJECTIVE BOX
INTERVAL HPI/OVERNIGHT EVENTS:    Patient evaluated at bedside. No acute distress. No acute events overnight.    MEDICATIONS  (STANDING):  acetaminophen     Tablet .. 975 milliGRAM(s) Oral every 6 hours  dextrose 5%. 1000 milliLiter(s) (50 mL/Hr) IV Continuous <Continuous>  dextrose 5%. 1000 milliLiter(s) (100 mL/Hr) IV Continuous <Continuous>  dextrose 50% Injectable 25 Gram(s) IV Push once  dextrose 50% Injectable 12.5 Gram(s) IV Push once  dextrose 50% Injectable 25 Gram(s) IV Push once  enoxaparin Injectable 40 milliGRAM(s) SubCutaneous every 24 hours  gabapentin 300 milliGRAM(s) Oral every 8 hours  glucagon  Injectable 1 milliGRAM(s) IntraMuscular once  influenza   Vaccine 0.5 milliLiter(s) IntraMuscular once  insulin lispro (ADMELOG) corrective regimen sliding scale   SubCutaneous Before meals and at bedtime  lidocaine   4% Patch 2 Patch Transdermal every 24 hours  methocarbamol 750 milliGRAM(s) Oral every 6 hours  polyethylene glycol 3350 17 Gram(s) Oral daily  vancomycin  IVPB 1250 milliGRAM(s) IV Intermittent once    MEDICATIONS  (PRN):  dextrose Oral Gel 15 Gram(s) Oral once PRN Blood Glucose LESS THAN 70 milliGRAM(s)/deciliter      Vital Signs Last 24 Hrs  T(C): 36.8 (26 Dec 2024 04:55), Max: 37 (25 Dec 2024 20:03)  T(F): 98.2 (26 Dec 2024 04:55), Max: 98.6 (25 Dec 2024 20:03)  HR: 56 (26 Dec 2024 04:55) (56 - 76)  BP: 145/76 (26 Dec 2024 04:55) (111/63 - 145/76)  BP(mean): --  RR: 18 (26 Dec 2024 04:55) (16 - 18)  SpO2: 93% (26 Dec 2024 04:55) (92% - 96%)    Parameters below as of 26 Dec 2024 04:55  Patient On (Oxygen Delivery Method): room air        PHYSICAL EXAM:  GENERAL: NAD, well-groomed, well-developed  HEAD:  Atraumatic, Normocephalic  EYES: EOMI, PERRLA, conjunctiva and sclera clear  NECK: Supple, No JVD  CHEST/LUNG: non-labored breathing on room air.   HEART: Regular rate and rhythm  ABDOMEN: Soft, Nontender, Nondistended  VASCULAR: Normal pulses, Normal capillary refill  EXTREMITIES:  2+ Peripheral Pulses, No cyanosis, No edema  SKIN: Warm, Intact      I&O's Detail    25 Dec 2024 07:01  -  26 Dec 2024 07:00  --------------------------------------------------------  IN:    Oral Fluid: 1040 mL  Total IN: 1040 mL    OUT:    Voided (mL): 620 mL  Total OUT: 620 mL    Total NET: 420 mL          LABS:                RADIOLOGY & ADDITIONAL STUDIES:

## 2024-12-27 LAB
ALBUMIN SERPL ELPH-MCNC: 3.5 G/DL — SIGNIFICANT CHANGE UP (ref 3.3–5.2)
ALP SERPL-CCNC: 98 U/L — SIGNIFICANT CHANGE UP (ref 40–120)
ALT FLD-CCNC: 50 U/L — HIGH
ANION GAP SERPL CALC-SCNC: 11 MMOL/L — SIGNIFICANT CHANGE UP (ref 5–17)
ANISOCYTOSIS BLD QL: SLIGHT — SIGNIFICANT CHANGE UP
APTT BLD: 30.2 SEC — SIGNIFICANT CHANGE UP (ref 24.5–35.6)
AST SERPL-CCNC: 55 U/L — HIGH
BASOPHILS # BLD AUTO: 0.06 K/UL — SIGNIFICANT CHANGE UP (ref 0–0.2)
BASOPHILS NFR BLD AUTO: 0.9 % — SIGNIFICANT CHANGE UP (ref 0–2)
BILIRUB SERPL-MCNC: 0.5 MG/DL — SIGNIFICANT CHANGE UP (ref 0.4–2)
BUN SERPL-MCNC: 14.7 MG/DL — SIGNIFICANT CHANGE UP (ref 8–20)
CALCIUM SERPL-MCNC: 8.7 MG/DL — SIGNIFICANT CHANGE UP (ref 8.4–10.5)
CHLORIDE SERPL-SCNC: 102 MMOL/L — SIGNIFICANT CHANGE UP (ref 96–108)
CO2 SERPL-SCNC: 22 MMOL/L — SIGNIFICANT CHANGE UP (ref 22–29)
CREAT SERPL-MCNC: 0.57 MG/DL — SIGNIFICANT CHANGE UP (ref 0.5–1.3)
EGFR: 109 ML/MIN/1.73M2 — SIGNIFICANT CHANGE UP
EOSINOPHIL # BLD AUTO: 0.31 K/UL — SIGNIFICANT CHANGE UP (ref 0–0.5)
EOSINOPHIL NFR BLD AUTO: 4.4 % — SIGNIFICANT CHANGE UP (ref 0–6)
GIANT PLATELETS BLD QL SMEAR: PRESENT — SIGNIFICANT CHANGE UP
GLUCOSE BLDC GLUCOMTR-MCNC: 158 MG/DL — HIGH (ref 70–99)
GLUCOSE BLDC GLUCOMTR-MCNC: 174 MG/DL — HIGH (ref 70–99)
GLUCOSE BLDC GLUCOMTR-MCNC: 199 MG/DL — HIGH (ref 70–99)
GLUCOSE BLDC GLUCOMTR-MCNC: 262 MG/DL — HIGH (ref 70–99)
GLUCOSE SERPL-MCNC: 283 MG/DL — HIGH (ref 70–99)
HCT VFR BLD CALC: 31 % — LOW (ref 34.5–45)
HGB BLD-MCNC: 10.1 G/DL — LOW (ref 11.5–15.5)
INR BLD: 0.91 RATIO — SIGNIFICANT CHANGE UP (ref 0.85–1.16)
LACTATE SERPL-SCNC: 2.1 MMOL/L — HIGH (ref 0.5–2)
LYMPHOCYTES # BLD AUTO: 1.51 K/UL — SIGNIFICANT CHANGE UP (ref 1–3.3)
LYMPHOCYTES # BLD AUTO: 21.2 % — SIGNIFICANT CHANGE UP (ref 13–44)
MAGNESIUM SERPL-MCNC: 1.9 MG/DL — SIGNIFICANT CHANGE UP (ref 1.6–2.6)
MANUAL SMEAR VERIFICATION: SIGNIFICANT CHANGE UP
MCHC RBC-ENTMCNC: 29.1 PG — SIGNIFICANT CHANGE UP (ref 27–34)
MCHC RBC-ENTMCNC: 32.6 G/DL — SIGNIFICANT CHANGE UP (ref 32–36)
MCV RBC AUTO: 89.3 FL — SIGNIFICANT CHANGE UP (ref 80–100)
MONOCYTES # BLD AUTO: 0.63 K/UL — SIGNIFICANT CHANGE UP (ref 0–0.9)
MONOCYTES NFR BLD AUTO: 8.9 % — SIGNIFICANT CHANGE UP (ref 2–14)
NEUTROPHILS # BLD AUTO: 4.27 K/UL — SIGNIFICANT CHANGE UP (ref 1.8–7.4)
NEUTROPHILS NFR BLD AUTO: 60.2 % — SIGNIFICANT CHANGE UP (ref 43–77)
OVALOCYTES BLD QL SMEAR: SLIGHT — SIGNIFICANT CHANGE UP
PHOSPHATE SERPL-MCNC: 3 MG/DL — SIGNIFICANT CHANGE UP (ref 2.4–4.7)
PLAT MORPH BLD: NORMAL — SIGNIFICANT CHANGE UP
PLATELET # BLD AUTO: 267 K/UL — SIGNIFICANT CHANGE UP (ref 150–400)
POIKILOCYTOSIS BLD QL AUTO: SLIGHT — SIGNIFICANT CHANGE UP
POLYCHROMASIA BLD QL SMEAR: SLIGHT — SIGNIFICANT CHANGE UP
POTASSIUM SERPL-MCNC: 4 MMOL/L — SIGNIFICANT CHANGE UP (ref 3.5–5.3)
POTASSIUM SERPL-SCNC: 4 MMOL/L — SIGNIFICANT CHANGE UP (ref 3.5–5.3)
PROT SERPL-MCNC: 6 G/DL — LOW (ref 6.6–8.7)
PROTHROM AB SERPL-ACNC: 10.6 SEC — SIGNIFICANT CHANGE UP (ref 9.9–13.4)
RBC # BLD: 3.47 M/UL — LOW (ref 3.8–5.2)
RBC # FLD: 13.8 % — SIGNIFICANT CHANGE UP (ref 10.3–14.5)
RBC BLD AUTO: ABNORMAL
SMUDGE CELLS # BLD: PRESENT — SIGNIFICANT CHANGE UP
SODIUM SERPL-SCNC: 135 MMOL/L — SIGNIFICANT CHANGE UP (ref 135–145)
TROPONIN T, HIGH SENSITIVITY RESULT: <6 NG/L — SIGNIFICANT CHANGE UP (ref 0–51)
VARIANT LYMPHS # BLD: 4.4 % — SIGNIFICANT CHANGE UP (ref 0–6)
WBC # BLD: 7.1 K/UL — SIGNIFICANT CHANGE UP (ref 3.8–10.5)
WBC # FLD AUTO: 7.1 K/UL — SIGNIFICANT CHANGE UP (ref 3.8–10.5)

## 2024-12-27 PROCEDURE — 99232 SBSQ HOSP IP/OBS MODERATE 35: CPT

## 2024-12-27 PROCEDURE — 71045 X-RAY EXAM CHEST 1 VIEW: CPT | Mod: 26

## 2024-12-27 PROCEDURE — 93010 ELECTROCARDIOGRAM REPORT: CPT

## 2024-12-27 RX ORDER — SODIUM CHLORIDE 9 MG/ML
1000 INJECTION, SOLUTION INTRAVENOUS
Refills: 0 | Status: DISCONTINUED | OUTPATIENT
Start: 2024-12-27 | End: 2024-12-29

## 2024-12-27 RX ORDER — INSULIN LISPRO 100/ML
VIAL (ML) SUBCUTANEOUS
Refills: 0 | Status: DISCONTINUED | OUTPATIENT
Start: 2024-12-27 | End: 2025-01-10

## 2024-12-27 RX ORDER — SODIUM CHLORIDE 9 MG/ML
1000 INJECTION, SOLUTION INTRAVENOUS ONCE
Refills: 0 | Status: COMPLETED | OUTPATIENT
Start: 2024-12-27 | End: 2024-12-27

## 2024-12-27 RX ADMIN — Medication 750 MILLIGRAM(S): at 12:22

## 2024-12-27 RX ADMIN — GABAPENTIN 300 MILLIGRAM(S): 300 CAPSULE ORAL at 05:21

## 2024-12-27 RX ADMIN — ACETAMINOPHEN 975 MILLIGRAM(S): 80 SOLUTION/ DROPS ORAL at 13:23

## 2024-12-27 RX ADMIN — ACETAMINOPHEN 975 MILLIGRAM(S): 80 SOLUTION/ DROPS ORAL at 23:17

## 2024-12-27 RX ADMIN — ENOXAPARIN SODIUM 40 MILLIGRAM(S): 60 INJECTION INTRAVENOUS; SUBCUTANEOUS at 05:23

## 2024-12-27 RX ADMIN — Medication 4: at 21:16

## 2024-12-27 RX ADMIN — LIDOCAINE 2 PATCH: 50 OINTMENT TOPICAL at 07:51

## 2024-12-27 RX ADMIN — ACETAMINOPHEN 975 MILLIGRAM(S): 80 SOLUTION/ DROPS ORAL at 18:44

## 2024-12-27 RX ADMIN — Medication 17 GRAM(S): at 12:23

## 2024-12-27 RX ADMIN — LIDOCAINE 2 PATCH: 50 OINTMENT TOPICAL at 10:51

## 2024-12-27 RX ADMIN — ACETAMINOPHEN 975 MILLIGRAM(S): 80 SOLUTION/ DROPS ORAL at 06:04

## 2024-12-27 RX ADMIN — ACETAMINOPHEN 975 MILLIGRAM(S): 80 SOLUTION/ DROPS ORAL at 12:23

## 2024-12-27 RX ADMIN — Medication 750 MILLIGRAM(S): at 23:17

## 2024-12-27 RX ADMIN — ACETAMINOPHEN 975 MILLIGRAM(S): 80 SOLUTION/ DROPS ORAL at 04:45

## 2024-12-27 RX ADMIN — GABAPENTIN 300 MILLIGRAM(S): 300 CAPSULE ORAL at 21:03

## 2024-12-27 RX ADMIN — Medication 750 MILLIGRAM(S): at 04:46

## 2024-12-27 RX ADMIN — SODIUM CHLORIDE 1000 MILLILITER(S): 9 INJECTION, SOLUTION INTRAVENOUS at 08:05

## 2024-12-27 RX ADMIN — Medication 2: at 08:26

## 2024-12-27 RX ADMIN — Medication 750 MILLIGRAM(S): at 17:48

## 2024-12-27 RX ADMIN — Medication 8: at 12:24

## 2024-12-27 RX ADMIN — LIDOCAINE 2 PATCH: 50 OINTMENT TOPICAL at 23:16

## 2024-12-27 RX ADMIN — Medication 4: at 17:46

## 2024-12-27 RX ADMIN — GABAPENTIN 300 MILLIGRAM(S): 300 CAPSULE ORAL at 12:23

## 2024-12-27 RX ADMIN — INSULIN GLARGINE-YFGN 5 UNIT(S): 100 INJECTION, SOLUTION SUBCUTANEOUS at 21:14

## 2024-12-27 RX ADMIN — ACETAMINOPHEN 975 MILLIGRAM(S): 80 SOLUTION/ DROPS ORAL at 17:44
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
## 2024-12-28 LAB
GLUCOSE BLDC GLUCOMTR-MCNC: 171 MG/DL — HIGH (ref 70–99)
GLUCOSE BLDC GLUCOMTR-MCNC: 174 MG/DL — HIGH (ref 70–99)
GLUCOSE BLDC GLUCOMTR-MCNC: 188 MG/DL — HIGH (ref 70–99)

## 2024-12-28 PROCEDURE — 99231 SBSQ HOSP IP/OBS SF/LOW 25: CPT

## 2024-12-28 RX ORDER — LEVOTHYROXINE SODIUM 175 UG/1
100 TABLET ORAL DAILY
Refills: 0 | Status: DISCONTINUED | OUTPATIENT
Start: 2024-12-28 | End: 2025-01-10

## 2024-12-28 RX ADMIN — Medication 750 MILLIGRAM(S): at 22:40

## 2024-12-28 RX ADMIN — Medication 4: at 08:38

## 2024-12-28 RX ADMIN — GABAPENTIN 300 MILLIGRAM(S): 300 CAPSULE ORAL at 22:28

## 2024-12-28 RX ADMIN — ENOXAPARIN SODIUM 40 MILLIGRAM(S): 60 INJECTION INTRAVENOUS; SUBCUTANEOUS at 05:00

## 2024-12-28 RX ADMIN — ACETAMINOPHEN 975 MILLIGRAM(S): 80 SOLUTION/ DROPS ORAL at 23:40

## 2024-12-28 RX ADMIN — Medication 4: at 16:52

## 2024-12-28 RX ADMIN — ACETAMINOPHEN 975 MILLIGRAM(S): 80 SOLUTION/ DROPS ORAL at 12:30

## 2024-12-28 RX ADMIN — ACETAMINOPHEN 975 MILLIGRAM(S): 80 SOLUTION/ DROPS ORAL at 16:55

## 2024-12-28 RX ADMIN — Medication 750 MILLIGRAM(S): at 05:00

## 2024-12-28 RX ADMIN — GABAPENTIN 300 MILLIGRAM(S): 300 CAPSULE ORAL at 12:30

## 2024-12-28 RX ADMIN — Medication 750 MILLIGRAM(S): at 12:29

## 2024-12-28 RX ADMIN — ACETAMINOPHEN 975 MILLIGRAM(S): 80 SOLUTION/ DROPS ORAL at 13:00

## 2024-12-28 RX ADMIN — ACETAMINOPHEN 975 MILLIGRAM(S): 80 SOLUTION/ DROPS ORAL at 22:40

## 2024-12-28 RX ADMIN — GABAPENTIN 300 MILLIGRAM(S): 300 CAPSULE ORAL at 05:00

## 2024-12-28 RX ADMIN — LIDOCAINE 2 PATCH: 50 OINTMENT TOPICAL at 11:16

## 2024-12-28 RX ADMIN — Medication 17 GRAM(S): at 12:29

## 2024-12-28 RX ADMIN — LEVOTHYROXINE SODIUM 100 MICROGRAM(S): 175 TABLET ORAL at 09:33

## 2024-12-28 RX ADMIN — ACETAMINOPHEN 975 MILLIGRAM(S): 80 SOLUTION/ DROPS ORAL at 05:00

## 2024-12-28 RX ADMIN — Medication 4: at 12:59

## 2024-12-28 RX ADMIN — Medication 750 MILLIGRAM(S): at 16:56

## 2024-12-28 NOTE — PROGRESS NOTE ADULT - SUBJECTIVE AND OBJECTIVE BOX
Subjective: Patient seen and examined. Doing well post op. No acute events overnight. Tolerating diet and pain well controlled. Ambulating. Denies HA NV CP SOB dizziness  PT eval done, rec rehab           MEDICATIONS  (STANDING):  acetaminophen     Tablet .. 975 milliGRAM(s) Oral every 6 hours  dextrose 5%. 1000 milliLiter(s) (50 mL/Hr) IV Continuous <Continuous>  dextrose 5%. 1000 milliLiter(s) (100 mL/Hr) IV Continuous <Continuous>  dextrose 50% Injectable 25 Gram(s) IV Push once  dextrose 50% Injectable 12.5 Gram(s) IV Push once  dextrose 50% Injectable 25 Gram(s) IV Push once  enoxaparin Injectable 40 milliGRAM(s) SubCutaneous every 24 hours  gabapentin 300 milliGRAM(s) Oral every 8 hours  glucagon  Injectable 1 milliGRAM(s) IntraMuscular once  influenza   Vaccine 0.5 milliLiter(s) IntraMuscular once  insulin glargine Injectable (LANTUS) 5 Unit(s) SubCutaneous at bedtime  insulin lispro (ADMELOG) corrective regimen sliding scale   SubCutaneous Before meals and at bedtime  lidocaine   4% Patch 2 Patch Transdermal every 24 hours  methocarbamol 750 milliGRAM(s) Oral every 6 hours  multiple electrolytes Injection Type 1 1000 milliLiter(s) (75 mL/Hr) IV Continuous <Continuous>  polyethylene glycol 3350 17 Gram(s) Oral daily    MEDICATIONS  (PRN):  dextrose Oral Gel 15 Gram(s) Oral once PRN Blood Glucose LESS THAN 70 milliGRAM(s)/deciliter      Vital Signs Last 24 Hrs  T(C): 36.8 (27 Dec 2024 23:24), Max: 36.8 (27 Dec 2024 12:34)  T(F): 98.2 (27 Dec 2024 23:24), Max: 98.3 (27 Dec 2024 20:19)  HR: 60 (27 Dec 2024 23:24) (57 - 66)  BP: 125/78 (27 Dec 2024 23:24) (106/63 - 132/84)  BP(mean): --  RR: 18 (27 Dec 2024 23:24) (16 - 18)  SpO2: 96% (27 Dec 2024 23:24) (92% - 97%)    Parameters below as of 27 Dec 2024 23:24  Patient On (Oxygen Delivery Method): room air        Physical Exam:      Constitutional: NAD  HEENT: PERRL, EOMI  Neck: No JVD, FROM without pain  Respiratory: Respirations non-labored, no accessory muscle use      LABS:                        10.1   7.10  )-----------( 267      ( 27 Dec 2024 10:20 )             31.0     12-27    135  |  102  |  14.7  ----------------------------<  283[H]  4.0   |  22.0  |  0.57    Ca    8.7      27 Dec 2024 10:20  Phos  3.0     12-27  Mg     1.9     12-27    TPro  6.0[L]  /  Alb  3.5  /  TBili  0.5  /  DBili  x   /  AST  55[H]  /  ALT  50[H]  /  AlkPhos  98  12-27    PT/INR - ( 27 Dec 2024 10:20 )   PT: 10.6 sec;   INR: 0.91 ratio         PTT - ( 27 Dec 2024 10:20 )  PTT:30.2 sec  Urinalysis Basic - ( 27 Dec 2024 10:20 )    Color: x / Appearance: x / SG: x / pH: x  Gluc: 283 mg/dL / Ketone: x  / Bili: x / Urobili: x   Blood: x / Protein: x / Nitrite: x   Leuk Esterase: x / RBC: x / WBC x   Sq Epi: x / Non Sq Epi: x / Bacteria: x

## 2024-12-29 LAB
GLUCOSE BLDC GLUCOMTR-MCNC: 143 MG/DL — HIGH (ref 70–99)
GLUCOSE BLDC GLUCOMTR-MCNC: 155 MG/DL — HIGH (ref 70–99)
GLUCOSE BLDC GLUCOMTR-MCNC: 155 MG/DL — HIGH (ref 70–99)
GLUCOSE BLDC GLUCOMTR-MCNC: 205 MG/DL — HIGH (ref 70–99)

## 2024-12-29 PROCEDURE — 99231 SBSQ HOSP IP/OBS SF/LOW 25: CPT

## 2024-12-29 RX ADMIN — Medication 6: at 00:39

## 2024-12-29 RX ADMIN — Medication 4: at 08:28

## 2024-12-29 RX ADMIN — Medication 750 MILLIGRAM(S): at 22:09

## 2024-12-29 RX ADMIN — GABAPENTIN 300 MILLIGRAM(S): 300 CAPSULE ORAL at 22:06

## 2024-12-29 RX ADMIN — Medication 750 MILLIGRAM(S): at 04:59

## 2024-12-29 RX ADMIN — ACETAMINOPHEN 975 MILLIGRAM(S): 80 SOLUTION/ DROPS ORAL at 04:59

## 2024-12-29 RX ADMIN — Medication 4: at 12:09

## 2024-12-29 RX ADMIN — GABAPENTIN 300 MILLIGRAM(S): 300 CAPSULE ORAL at 05:01

## 2024-12-29 RX ADMIN — LIDOCAINE 2 PATCH: 50 OINTMENT TOPICAL at 12:10

## 2024-12-29 RX ADMIN — ACETAMINOPHEN 975 MILLIGRAM(S): 80 SOLUTION/ DROPS ORAL at 12:09

## 2024-12-29 RX ADMIN — ACETAMINOPHEN 975 MILLIGRAM(S): 80 SOLUTION/ DROPS ORAL at 12:42

## 2024-12-29 RX ADMIN — LIDOCAINE 2 PATCH: 50 OINTMENT TOPICAL at 00:13

## 2024-12-29 RX ADMIN — LEVOTHYROXINE SODIUM 100 MICROGRAM(S): 175 TABLET ORAL at 04:59

## 2024-12-29 RX ADMIN — Medication 750 MILLIGRAM(S): at 12:10

## 2024-12-29 RX ADMIN — ACETAMINOPHEN 975 MILLIGRAM(S): 80 SOLUTION/ DROPS ORAL at 17:06

## 2024-12-29 RX ADMIN — GABAPENTIN 300 MILLIGRAM(S): 300 CAPSULE ORAL at 12:10

## 2024-12-29 RX ADMIN — INSULIN GLARGINE-YFGN 5 UNIT(S): 100 INJECTION, SOLUTION SUBCUTANEOUS at 00:37

## 2024-12-29 RX ADMIN — ACETAMINOPHEN 975 MILLIGRAM(S): 80 SOLUTION/ DROPS ORAL at 05:46

## 2024-12-29 RX ADMIN — Medication 750 MILLIGRAM(S): at 17:05

## 2024-12-29 RX ADMIN — LIDOCAINE 2 PATCH: 50 OINTMENT TOPICAL at 08:00

## 2024-12-29 RX ADMIN — ENOXAPARIN SODIUM 40 MILLIGRAM(S): 60 INJECTION INTRAVENOUS; SUBCUTANEOUS at 05:00

## 2024-12-29 NOTE — PROGRESS NOTE ADULT - SUBJECTIVE AND OBJECTIVE BOX
SUBJECTIVE / 24H EVENTS:    Pt seen and examined at bedside by the team during rounds. Pt found to be resting in bed comfortably with no new acute complaints at the time of examination. Pt denies CP, SOB, N/V, fever, chills.     MEDICATIONS  (STANDING):  acetaminophen     Tablet .. 975 milliGRAM(s) Oral every 6 hours  dextrose 5%. 1000 milliLiter(s) (50 mL/Hr) IV Continuous <Continuous>  dextrose 5%. 1000 milliLiter(s) (100 mL/Hr) IV Continuous <Continuous>  dextrose 50% Injectable 25 Gram(s) IV Push once  dextrose 50% Injectable 12.5 Gram(s) IV Push once  dextrose 50% Injectable 25 Gram(s) IV Push once  enoxaparin Injectable 40 milliGRAM(s) SubCutaneous every 24 hours  gabapentin 300 milliGRAM(s) Oral every 8 hours  glucagon  Injectable 1 milliGRAM(s) IntraMuscular once  influenza   Vaccine 0.5 milliLiter(s) IntraMuscular once  insulin glargine Injectable (LANTUS) 5 Unit(s) SubCutaneous at bedtime  insulin lispro (ADMELOG) corrective regimen sliding scale   SubCutaneous Before meals and at bedtime  levothyroxine 100 MICROGram(s) Oral daily  lidocaine   4% Patch 2 Patch Transdermal every 24 hours  methocarbamol 750 milliGRAM(s) Oral every 6 hours  multiple electrolytes Injection Type 1 1000 milliLiter(s) (75 mL/Hr) IV Continuous <Continuous>  polyethylene glycol 3350 17 Gram(s) Oral daily    MEDICATIONS  (PRN):  dextrose Oral Gel 15 Gram(s) Oral once PRN Blood Glucose LESS THAN 70 milliGRAM(s)/deciliter      Vital Signs Last 24 Hrs  T(C): 36.7 (28 Dec 2024 20:24), Max: 36.8 (28 Dec 2024 16:35)  T(F): 98 (28 Dec 2024 20:24), Max: 98.2 (28 Dec 2024 16:35)  HR: 62 (28 Dec 2024 20:24) (60 - 65)  BP: 124/73 (28 Dec 2024 20:24) (99/61 - 124/73)  BP(mean): --  RR: 17 (28 Dec 2024 20:24) (12 - 18)  SpO2: 94% (28 Dec 2024 20:24) (91% - 95%)    Parameters below as of 28 Dec 2024 20:24  Patient On (Oxygen Delivery Method): room air      Physical Exam  Constitutional: patient appears comfortable resting in bed, in no apparent distress  Respiratory: respirations are unlabored, no accessory muscle use, no conversational dyspnea  Cardiovascular: regular rate & rhythm  Gastrointestinal: abdomen is soft & non-distended, non-tender, no rebound tenderness / guarding  Musculoskeletal: MOORE x 4 spontaneously, extremities are without point tenderness or deformity      I&O's Detail    27 Dec 2024 07:01  -  28 Dec 2024 07:00  --------------------------------------------------------  IN:    multiple electrolytes Injection Type 1 Bolus: 1000 mL    multiple electrolytes Injection Type 1.: 750 mL    Oral Fluid: 720 mL  Total IN: 2470 mL    OUT:    Voided (mL): 400 mL  Total OUT: 400 mL    Total NET: 2070 mL          LABS:                        10.1   7.10  )-----------( 267      ( 27 Dec 2024 10:20 )             31.0     12-27    135  |  102  |  14.7  ----------------------------<  283[H]  4.0   |  22.0  |  0.57    Ca    8.7      27 Dec 2024 10:20  Phos  3.0     12-27  Mg     1.9     12-27    TPro  6.0[L]  /  Alb  3.5  /  TBili  0.5  /  DBili  x   /  AST  55[H]  /  ALT  50[H]  /  AlkPhos  98  12-27    PT/INR - ( 27 Dec 2024 10:20 )   PT: 10.6 sec;   INR: 0.91 ratio         PTT - ( 27 Dec 2024 10:20 )  PTT:30.2 sec  Urinalysis Basic - ( 27 Dec 2024 10:20 )    Color: x / Appearance: x / SG: x / pH: x  Gluc: 283 mg/dL / Ketone: x  / Bili: x / Urobili: x   Blood: x / Protein: x / Nitrite: x   Leuk Esterase: x / RBC: x / WBC x   Sq Epi: x / Non Sq Epi: x / Bacteria: x
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
## 2024-12-30 LAB
GLUCOSE BLDC GLUCOMTR-MCNC: 138 MG/DL — HIGH (ref 70–99)
GLUCOSE BLDC GLUCOMTR-MCNC: 140 MG/DL — HIGH (ref 70–99)
GLUCOSE BLDC GLUCOMTR-MCNC: 177 MG/DL — HIGH (ref 70–99)
GLUCOSE BLDC GLUCOMTR-MCNC: 200 MG/DL — HIGH (ref 70–99)
GLUCOSE BLDC GLUCOMTR-MCNC: 257 MG/DL — HIGH (ref 70–99)

## 2024-12-30 PROCEDURE — 99231 SBSQ HOSP IP/OBS SF/LOW 25: CPT

## 2024-12-30 RX ADMIN — GABAPENTIN 300 MILLIGRAM(S): 300 CAPSULE ORAL at 13:01

## 2024-12-30 RX ADMIN — ACETAMINOPHEN 975 MILLIGRAM(S): 80 SOLUTION/ DROPS ORAL at 06:14

## 2024-12-30 RX ADMIN — ACETAMINOPHEN 975 MILLIGRAM(S): 80 SOLUTION/ DROPS ORAL at 07:10

## 2024-12-30 RX ADMIN — LIDOCAINE 2 PATCH: 50 OINTMENT TOPICAL at 13:30

## 2024-12-30 RX ADMIN — LIDOCAINE 2 PATCH: 50 OINTMENT TOPICAL at 01:08

## 2024-12-30 RX ADMIN — LIDOCAINE 2 PATCH: 50 OINTMENT TOPICAL at 07:16

## 2024-12-30 RX ADMIN — ACETAMINOPHEN 975 MILLIGRAM(S): 80 SOLUTION/ DROPS ORAL at 13:01

## 2024-12-30 RX ADMIN — Medication 4: at 01:07

## 2024-12-30 RX ADMIN — ENOXAPARIN SODIUM 40 MILLIGRAM(S): 60 INJECTION INTRAVENOUS; SUBCUTANEOUS at 06:17

## 2024-12-30 RX ADMIN — ACETAMINOPHEN 975 MILLIGRAM(S): 80 SOLUTION/ DROPS ORAL at 00:45

## 2024-12-30 RX ADMIN — LEVOTHYROXINE SODIUM 100 MICROGRAM(S): 175 TABLET ORAL at 06:14

## 2024-12-30 RX ADMIN — Medication 4: at 08:19

## 2024-12-30 RX ADMIN — INSULIN GLARGINE-YFGN 5 UNIT(S): 100 INJECTION, SOLUTION SUBCUTANEOUS at 22:07

## 2024-12-30 RX ADMIN — Medication 750 MILLIGRAM(S): at 17:50

## 2024-12-30 RX ADMIN — INSULIN GLARGINE-YFGN 5 UNIT(S): 100 INJECTION, SOLUTION SUBCUTANEOUS at 00:46

## 2024-12-30 RX ADMIN — ACETAMINOPHEN 975 MILLIGRAM(S): 80 SOLUTION/ DROPS ORAL at 17:50

## 2024-12-30 RX ADMIN — ACETAMINOPHEN 975 MILLIGRAM(S): 80 SOLUTION/ DROPS ORAL at 01:15

## 2024-12-30 RX ADMIN — Medication 750 MILLIGRAM(S): at 13:01

## 2024-12-30 RX ADMIN — ACETAMINOPHEN 975 MILLIGRAM(S): 80 SOLUTION/ DROPS ORAL at 14:01

## 2024-12-30 RX ADMIN — Medication 8: at 22:04

## 2024-12-30 RX ADMIN — GABAPENTIN 300 MILLIGRAM(S): 300 CAPSULE ORAL at 21:09

## 2024-12-30 RX ADMIN — ACETAMINOPHEN 975 MILLIGRAM(S): 80 SOLUTION/ DROPS ORAL at 18:50

## 2024-12-30 RX ADMIN — GABAPENTIN 300 MILLIGRAM(S): 300 CAPSULE ORAL at 06:14

## 2024-12-30 RX ADMIN — Medication 750 MILLIGRAM(S): at 06:14

## 2024-12-30 NOTE — PROGRESS NOTE ADULT - SUBJECTIVE AND OBJECTIVE BOX
INTERVAL HPI/OVERNIGHT EVENTS:    Patient evaluated at bedside. No acute distress. No acute events overnight.    MEDICATIONS  (STANDING):  acetaminophen     Tablet .. 975 milliGRAM(s) Oral every 6 hours  dextrose 5%. 1000 milliLiter(s) (50 mL/Hr) IV Continuous <Continuous>  dextrose 5%. 1000 milliLiter(s) (100 mL/Hr) IV Continuous <Continuous>  dextrose 50% Injectable 12.5 Gram(s) IV Push once  dextrose 50% Injectable 25 Gram(s) IV Push once  dextrose 50% Injectable 25 Gram(s) IV Push once  enoxaparin Injectable 40 milliGRAM(s) SubCutaneous every 24 hours  gabapentin 300 milliGRAM(s) Oral every 8 hours  glucagon  Injectable 1 milliGRAM(s) IntraMuscular once  influenza   Vaccine 0.5 milliLiter(s) IntraMuscular once  insulin glargine Injectable (LANTUS) 5 Unit(s) SubCutaneous at bedtime  insulin lispro (ADMELOG) corrective regimen sliding scale   SubCutaneous Before meals and at bedtime  levothyroxine 100 MICROGram(s) Oral daily  lidocaine   4% Patch 2 Patch Transdermal every 24 hours  methocarbamol 750 milliGRAM(s) Oral every 6 hours  polyethylene glycol 3350 17 Gram(s) Oral daily    MEDICATIONS  (PRN):  dextrose Oral Gel 15 Gram(s) Oral once PRN Blood Glucose LESS THAN 70 milliGRAM(s)/deciliter      Vital Signs Last 24 Hrs  T(C): 36.6 (30 Dec 2024 04:40), Max: 37.1 (29 Dec 2024 13:03)  T(F): 97.9 (30 Dec 2024 04:40), Max: 98.7 (29 Dec 2024 13:03)  HR: 58 (30 Dec 2024 04:40) (56 - 65)  BP: 122/70 (30 Dec 2024 04:40) (109/65 - 132/73)  BP(mean): --  RR: 18 (30 Dec 2024 04:40) (17 - 18)  SpO2: 94% (30 Dec 2024 04:40) (93% - 95%)    Parameters below as of 30 Dec 2024 04:40  Patient On (Oxygen Delivery Method): room air        PHYSICAL EXAM:  GENERAL: NAD, well-groomed, well-developed  HEAD:  Atraumatic, Normocephalic  EYES: EOMI, PERRLA, conjunctiva and sclera clear  NECK: Supple, No JVD  CHEST/LUNG: non-labored breathing on room air.   HEART: Regular rate and rhythm  ABDOMEN: Soft, Nontender, Nondistended  VASCULAR: Normal pulses, Normal capillary refill  ETREMITIES: b/l UE splints and RLE splints in place      I&O's Detail    29 Dec 2024 07:01  -  30 Dec 2024 07:00  --------------------------------------------------------  IN:    Oral Fluid: 240 mL  Total IN: 240 mL    OUT:  Total OUT: 0 mL    Total NET: 240 mL          LABS:                RADIOLOGY & ADDITIONAL STUDIES:

## 2024-12-30 NOTE — PROGRESS NOTE ADULT - NS ATTEND AMEND GEN_ALL_CORE FT
I have seen and examined this patient with the surgical team on AM rounds.  No acute events overnight.  Patient appears well this morning.  Denies pain in her UEs or LE.  No numbness/tingling.  Working with PT.  Will DCP to [facility] when new insurance arrives on 1/1/25.

## 2024-12-31 LAB
GLUCOSE BLDC GLUCOMTR-MCNC: 148 MG/DL — HIGH (ref 70–99)
GLUCOSE BLDC GLUCOMTR-MCNC: 170 MG/DL — HIGH (ref 70–99)
GLUCOSE BLDC GLUCOMTR-MCNC: 178 MG/DL — HIGH (ref 70–99)
GLUCOSE BLDC GLUCOMTR-MCNC: 186 MG/DL — HIGH (ref 70–99)

## 2024-12-31 PROCEDURE — 99231 SBSQ HOSP IP/OBS SF/LOW 25: CPT

## 2024-12-31 RX ORDER — METHOCARBAMOL 500 MG
1 TABLET ORAL
Qty: 0 | Refills: 0 | DISCHARGE
Start: 2024-12-31

## 2024-12-31 RX ORDER — GABAPENTIN 300 MG/1
1 CAPSULE ORAL
Qty: 0 | Refills: 0 | DISCHARGE
Start: 2024-12-31

## 2024-12-31 RX ORDER — POLYETHYLENE GLYCOL 3350 17 G/DOSE
17 POWDER (GRAM) ORAL
Qty: 0 | Refills: 0 | DISCHARGE
Start: 2024-12-31

## 2024-12-31 RX ORDER — LIDOCAINE 50 MG/G
2 OINTMENT TOPICAL
Qty: 0 | Refills: 0 | DISCHARGE
Start: 2024-12-31

## 2024-12-31 RX ORDER — ACETAMINOPHEN 80 MG/.8ML
3 SOLUTION/ DROPS ORAL
Qty: 0 | Refills: 0 | DISCHARGE
Start: 2024-12-31

## 2024-12-31 RX ORDER — ENOXAPARIN SODIUM 60 MG/.6ML
40 INJECTION INTRAVENOUS; SUBCUTANEOUS
Qty: 0 | Refills: 0 | DISCHARGE
Start: 2024-12-31

## 2024-12-31 RX ADMIN — Medication 750 MILLIGRAM(S): at 23:06

## 2024-12-31 RX ADMIN — Medication 4: at 16:53

## 2024-12-31 RX ADMIN — ACETAMINOPHEN 975 MILLIGRAM(S): 80 SOLUTION/ DROPS ORAL at 05:23

## 2024-12-31 RX ADMIN — Medication 4: at 08:21

## 2024-12-31 RX ADMIN — ENOXAPARIN SODIUM 40 MILLIGRAM(S): 60 INJECTION INTRAVENOUS; SUBCUTANEOUS at 05:25

## 2024-12-31 RX ADMIN — ACETAMINOPHEN 975 MILLIGRAM(S): 80 SOLUTION/ DROPS ORAL at 23:05

## 2024-12-31 RX ADMIN — LEVOTHYROXINE SODIUM 100 MICROGRAM(S): 175 TABLET ORAL at 05:23

## 2024-12-31 RX ADMIN — LIDOCAINE 2 PATCH: 50 OINTMENT TOPICAL at 07:49

## 2024-12-31 RX ADMIN — LIDOCAINE 2 PATCH: 50 OINTMENT TOPICAL at 00:39

## 2024-12-31 RX ADMIN — Medication 750 MILLIGRAM(S): at 00:39

## 2024-12-31 RX ADMIN — ACETAMINOPHEN 975 MILLIGRAM(S): 80 SOLUTION/ DROPS ORAL at 11:51

## 2024-12-31 RX ADMIN — ACETAMINOPHEN 975 MILLIGRAM(S): 80 SOLUTION/ DROPS ORAL at 00:39

## 2024-12-31 RX ADMIN — GABAPENTIN 300 MILLIGRAM(S): 300 CAPSULE ORAL at 13:20

## 2024-12-31 RX ADMIN — GABAPENTIN 300 MILLIGRAM(S): 300 CAPSULE ORAL at 22:19

## 2024-12-31 RX ADMIN — GABAPENTIN 300 MILLIGRAM(S): 300 CAPSULE ORAL at 05:24

## 2024-12-31 RX ADMIN — Medication 750 MILLIGRAM(S): at 11:51

## 2024-12-31 RX ADMIN — Medication 750 MILLIGRAM(S): at 05:24

## 2024-12-31 RX ADMIN — ACETAMINOPHEN 975 MILLIGRAM(S): 80 SOLUTION/ DROPS ORAL at 12:25

## 2024-12-31 RX ADMIN — ACETAMINOPHEN 975 MILLIGRAM(S): 80 SOLUTION/ DROPS ORAL at 17:16

## 2024-12-31 RX ADMIN — ACETAMINOPHEN 975 MILLIGRAM(S): 80 SOLUTION/ DROPS ORAL at 01:30

## 2024-12-31 RX ADMIN — Medication 750 MILLIGRAM(S): at 17:18

## 2024-12-31 RX ADMIN — INSULIN GLARGINE-YFGN 5 UNIT(S): 100 INJECTION, SOLUTION SUBCUTANEOUS at 22:47

## 2024-12-31 RX ADMIN — LIDOCAINE 2 PATCH: 50 OINTMENT TOPICAL at 22:20

## 2024-12-31 RX ADMIN — LIDOCAINE 2 PATCH: 50 OINTMENT TOPICAL at 12:39

## 2024-12-31 RX ADMIN — ACETAMINOPHEN 975 MILLIGRAM(S): 80 SOLUTION/ DROPS ORAL at 06:20

## 2024-12-31 RX ADMIN — Medication 4: at 22:39

## 2024-12-31 NOTE — PROGRESS NOTE ADULT - SUBJECTIVE AND OBJECTIVE BOX
TRAUMA SURGERY PROGRESS NOTE    Subjective: Patient examined at bedside this AM. Reports no new issues or concerns. NAEO      Objective:  Vital Signs  T(C): 36.4 (12-31 @ 09:21), Max: 36.9 (12-31 @ 00:49)  HR: 69 (12-31 @ 09:21) (54 - 71)  BP: 109/71 (12-31 @ 09:21) (105/67 - 144/74)  RR: 14 (12-31 @ 09:21) (14 - 17)  SpO2: 94% (12-31 @ 09:21) (94% - 96%)      12-30-24 @ 07:01  -  12-31-24 @ 07:00  --------------------------------------------------------  IN:  Total IN: 0 mL    OUT:    Voided (mL): 700 mL  Total OUT: 700 mL    Total NET: -700 mL      Physical Exam:  General: alert and oriented, NAD  Resp: airway patent, respirations unlabored  Abdomen: soft, nontender, nondistended  Extremities: RLE in splint, BUE splinted   Skin: warm, dry, appropriate color      CAPILLARY BLOOD GLUCOSE  POCT Blood Glucose.: 148 mg/dL (31 Dec 2024 11:49)  POCT Blood Glucose.: 186 mg/dL (31 Dec 2024 08:03)  POCT Blood Glucose.: 257 mg/dL (30 Dec 2024 21:35)  POCT Blood Glucose.: 138 mg/dL (30 Dec 2024 17:02)

## 2025-01-01 LAB
GLUCOSE BLDC GLUCOMTR-MCNC: 131 MG/DL — HIGH (ref 70–99)
GLUCOSE BLDC GLUCOMTR-MCNC: 159 MG/DL — HIGH (ref 70–99)
GLUCOSE BLDC GLUCOMTR-MCNC: 217 MG/DL — HIGH (ref 70–99)
GLUCOSE BLDC GLUCOMTR-MCNC: 230 MG/DL — HIGH (ref 70–99)

## 2025-01-01 PROCEDURE — 99232 SBSQ HOSP IP/OBS MODERATE 35: CPT

## 2025-01-01 RX ADMIN — Medication 750 MILLIGRAM(S): at 23:10

## 2025-01-01 RX ADMIN — GABAPENTIN 300 MILLIGRAM(S): 300 CAPSULE ORAL at 05:35

## 2025-01-01 RX ADMIN — LIDOCAINE 2 PATCH: 50 OINTMENT TOPICAL at 23:09

## 2025-01-01 RX ADMIN — Medication 6: at 21:32

## 2025-01-01 RX ADMIN — ACETAMINOPHEN 975 MILLIGRAM(S): 80 SOLUTION/ DROPS ORAL at 23:10

## 2025-01-01 RX ADMIN — ACETAMINOPHEN 975 MILLIGRAM(S): 80 SOLUTION/ DROPS ORAL at 13:18

## 2025-01-01 RX ADMIN — LEVOTHYROXINE SODIUM 100 MICROGRAM(S): 175 TABLET ORAL at 05:35

## 2025-01-01 RX ADMIN — Medication 750 MILLIGRAM(S): at 05:35

## 2025-01-01 RX ADMIN — ACETAMINOPHEN 975 MILLIGRAM(S): 80 SOLUTION/ DROPS ORAL at 17:33

## 2025-01-01 RX ADMIN — ACETAMINOPHEN 975 MILLIGRAM(S): 80 SOLUTION/ DROPS ORAL at 12:18

## 2025-01-01 RX ADMIN — Medication 750 MILLIGRAM(S): at 12:18

## 2025-01-01 RX ADMIN — Medication 6: at 14:14

## 2025-01-01 RX ADMIN — ACETAMINOPHEN 975 MILLIGRAM(S): 80 SOLUTION/ DROPS ORAL at 04:44

## 2025-01-01 RX ADMIN — GABAPENTIN 300 MILLIGRAM(S): 300 CAPSULE ORAL at 21:28

## 2025-01-01 RX ADMIN — Medication 4: at 08:45

## 2025-01-01 RX ADMIN — LIDOCAINE 2 PATCH: 50 OINTMENT TOPICAL at 12:20

## 2025-01-01 RX ADMIN — LIDOCAINE 2 PATCH: 50 OINTMENT TOPICAL at 17:17

## 2025-01-01 RX ADMIN — ENOXAPARIN SODIUM 40 MILLIGRAM(S): 60 INJECTION INTRAVENOUS; SUBCUTANEOUS at 05:32

## 2025-01-01 RX ADMIN — Medication 750 MILLIGRAM(S): at 17:33

## 2025-01-01 RX ADMIN — ACETAMINOPHEN 975 MILLIGRAM(S): 80 SOLUTION/ DROPS ORAL at 18:33

## 2025-01-01 RX ADMIN — ACETAMINOPHEN 975 MILLIGRAM(S): 80 SOLUTION/ DROPS ORAL at 05:32

## 2025-01-01 RX ADMIN — GABAPENTIN 300 MILLIGRAM(S): 300 CAPSULE ORAL at 12:18

## 2025-01-01 RX ADMIN — INSULIN GLARGINE-YFGN 5 UNIT(S): 100 INJECTION, SOLUTION SUBCUTANEOUS at 21:31
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
## 2025-01-02 LAB
GLUCOSE BLDC GLUCOMTR-MCNC: 158 MG/DL — HIGH (ref 70–99)
GLUCOSE BLDC GLUCOMTR-MCNC: 166 MG/DL — HIGH (ref 70–99)
GLUCOSE BLDC GLUCOMTR-MCNC: 178 MG/DL — HIGH (ref 70–99)
GLUCOSE BLDC GLUCOMTR-MCNC: 185 MG/DL — HIGH (ref 70–99)

## 2025-01-02 PROCEDURE — 99232 SBSQ HOSP IP/OBS MODERATE 35: CPT | Mod: GC

## 2025-01-02 RX ADMIN — ACETAMINOPHEN 975 MILLIGRAM(S): 80 SOLUTION/ DROPS ORAL at 00:10

## 2025-01-02 RX ADMIN — LIDOCAINE 2 PATCH: 50 OINTMENT TOPICAL at 11:00

## 2025-01-02 RX ADMIN — Medication 4: at 08:34

## 2025-01-02 RX ADMIN — ACETAMINOPHEN 975 MILLIGRAM(S): 80 SOLUTION/ DROPS ORAL at 14:17

## 2025-01-02 RX ADMIN — LIDOCAINE 2 PATCH: 50 OINTMENT TOPICAL at 09:21

## 2025-01-02 RX ADMIN — GABAPENTIN 300 MILLIGRAM(S): 300 CAPSULE ORAL at 05:10

## 2025-01-02 RX ADMIN — Medication 750 MILLIGRAM(S): at 05:10

## 2025-01-02 RX ADMIN — Medication 750 MILLIGRAM(S): at 13:17

## 2025-01-02 RX ADMIN — INSULIN GLARGINE-YFGN 5 UNIT(S): 100 INJECTION, SOLUTION SUBCUTANEOUS at 21:53

## 2025-01-02 RX ADMIN — ACETAMINOPHEN 975 MILLIGRAM(S): 80 SOLUTION/ DROPS ORAL at 13:17

## 2025-01-02 RX ADMIN — Medication 750 MILLIGRAM(S): at 23:21

## 2025-01-02 RX ADMIN — ENOXAPARIN SODIUM 40 MILLIGRAM(S): 60 INJECTION INTRAVENOUS; SUBCUTANEOUS at 05:10

## 2025-01-02 RX ADMIN — GABAPENTIN 300 MILLIGRAM(S): 300 CAPSULE ORAL at 13:17

## 2025-01-02 RX ADMIN — ACETAMINOPHEN 975 MILLIGRAM(S): 80 SOLUTION/ DROPS ORAL at 05:10

## 2025-01-02 RX ADMIN — Medication 4: at 17:36

## 2025-01-02 RX ADMIN — ACETAMINOPHEN 975 MILLIGRAM(S): 80 SOLUTION/ DROPS ORAL at 23:21

## 2025-01-02 RX ADMIN — Medication 4: at 13:16

## 2025-01-02 RX ADMIN — LEVOTHYROXINE SODIUM 100 MICROGRAM(S): 175 TABLET ORAL at 05:10

## 2025-01-02 RX ADMIN — ACETAMINOPHEN 975 MILLIGRAM(S): 80 SOLUTION/ DROPS ORAL at 19:22

## 2025-01-02 RX ADMIN — ACETAMINOPHEN 975 MILLIGRAM(S): 80 SOLUTION/ DROPS ORAL at 06:10

## 2025-01-02 RX ADMIN — LIDOCAINE 2 PATCH: 50 OINTMENT TOPICAL at 23:20

## 2025-01-02 RX ADMIN — ACETAMINOPHEN 975 MILLIGRAM(S): 80 SOLUTION/ DROPS ORAL at 18:22

## 2025-01-02 RX ADMIN — Medication 750 MILLIGRAM(S): at 18:23

## 2025-01-02 RX ADMIN — GABAPENTIN 300 MILLIGRAM(S): 300 CAPSULE ORAL at 21:52

## 2025-01-02 RX ADMIN — Medication 4: at 21:53

## 2025-01-02 NOTE — PROGRESS NOTE ADULT - SUBJECTIVE AND OBJECTIVE BOX
HPI/OVERNIGHT EVENTS: Patient seen and examined at bedside this AM. No overnight events. No complaints.     Vital Signs Last 24 Hrs  T(C): 36.6 (02 Jan 2025 04:11), Max: 36.9 (01 Jan 2025 15:53)  T(F): 97.8 (02 Jan 2025 04:11), Max: 98.5 (01 Jan 2025 15:53)  HR: 78 (02 Jan 2025 04:11) (55 - 78)  BP: 113/67 (02 Jan 2025 04:11) (104/58 - 119/68)  BP(mean): --  RR: 18 (02 Jan 2025 04:11) (14 - 18)  SpO2: 97% (02 Jan 2025 04:11) (94% - 97%)    Parameters below as of 02 Jan 2025 04:11  Patient On (Oxygen Delivery Method): room air        I&O's Detail        PHYSICAL EXAM:  General: alert and oriented, NAD  Resp: airway patent, respirations unlabored  Abdomen: soft, nontender, nondistended  Extremities: RLE in splint, BUE splinted   Skin: warm, dry, appropriate color      LABS:                MEDICATIONS  (STANDING):  acetaminophen     Tablet .. 975 milliGRAM(s) Oral every 6 hours  dextrose 5%. 1000 milliLiter(s) (50 mL/Hr) IV Continuous <Continuous>  dextrose 5%. 1000 milliLiter(s) (100 mL/Hr) IV Continuous <Continuous>  dextrose 50% Injectable 25 Gram(s) IV Push once  dextrose 50% Injectable 12.5 Gram(s) IV Push once  dextrose 50% Injectable 25 Gram(s) IV Push once  enoxaparin Injectable 40 milliGRAM(s) SubCutaneous every 24 hours  gabapentin 300 milliGRAM(s) Oral every 8 hours  glucagon  Injectable 1 milliGRAM(s) IntraMuscular once  influenza   Vaccine 0.5 milliLiter(s) IntraMuscular once  insulin glargine Injectable (LANTUS) 5 Unit(s) SubCutaneous at bedtime  insulin lispro (ADMELOG) corrective regimen sliding scale   SubCutaneous Before meals and at bedtime  levothyroxine 100 MICROGram(s) Oral daily  lidocaine   4% Patch 2 Patch Transdermal every 24 hours  methocarbamol 750 milliGRAM(s) Oral every 6 hours  polyethylene glycol 3350 17 Gram(s) Oral daily    MEDICATIONS  (PRN):  dextrose Oral Gel 15 Gram(s) Oral once PRN Blood Glucose LESS THAN 70 milliGRAM(s)/deciliter      MICRO:   Cultures     STUDIES:   EKG, CXR, U/S, CT, MRI

## 2025-01-03 LAB
GLUCOSE BLDC GLUCOMTR-MCNC: 149 MG/DL — HIGH (ref 70–99)
GLUCOSE BLDC GLUCOMTR-MCNC: 149 MG/DL — HIGH (ref 70–99)
GLUCOSE BLDC GLUCOMTR-MCNC: 160 MG/DL — HIGH (ref 70–99)
GLUCOSE BLDC GLUCOMTR-MCNC: 185 MG/DL — HIGH (ref 70–99)

## 2025-01-03 PROCEDURE — 73060 X-RAY EXAM OF HUMERUS: CPT | Mod: 26,LT

## 2025-01-03 PROCEDURE — 99232 SBSQ HOSP IP/OBS MODERATE 35: CPT

## 2025-01-03 RX ORDER — GINKGO BILOBA 40 MG
5 CAPSULE ORAL AT BEDTIME
Refills: 0 | Status: DISCONTINUED | OUTPATIENT
Start: 2025-01-03 | End: 2025-01-10

## 2025-01-03 RX ORDER — KETOROLAC TROMETHAMINE 30 MG/ML
15 INJECTION INTRAMUSCULAR; INTRAVENOUS ONCE
Refills: 0 | Status: DISCONTINUED | OUTPATIENT
Start: 2025-01-03 | End: 2025-01-03

## 2025-01-03 RX ADMIN — Medication 750 MILLIGRAM(S): at 18:15

## 2025-01-03 RX ADMIN — ACETAMINOPHEN 975 MILLIGRAM(S): 80 SOLUTION/ DROPS ORAL at 23:09

## 2025-01-03 RX ADMIN — GABAPENTIN 300 MILLIGRAM(S): 300 CAPSULE ORAL at 15:10

## 2025-01-03 RX ADMIN — GABAPENTIN 300 MILLIGRAM(S): 300 CAPSULE ORAL at 20:59

## 2025-01-03 RX ADMIN — LEVOTHYROXINE SODIUM 100 MICROGRAM(S): 175 TABLET ORAL at 05:43

## 2025-01-03 RX ADMIN — ENOXAPARIN SODIUM 40 MILLIGRAM(S): 60 INJECTION INTRAVENOUS; SUBCUTANEOUS at 05:43

## 2025-01-03 RX ADMIN — Medication 4: at 11:59

## 2025-01-03 RX ADMIN — ACETAMINOPHEN 975 MILLIGRAM(S): 80 SOLUTION/ DROPS ORAL at 19:15

## 2025-01-03 RX ADMIN — ACETAMINOPHEN 975 MILLIGRAM(S): 80 SOLUTION/ DROPS ORAL at 23:22

## 2025-01-03 RX ADMIN — LIDOCAINE 2 PATCH: 50 OINTMENT TOPICAL at 07:32

## 2025-01-03 RX ADMIN — Medication 4: at 16:40

## 2025-01-03 RX ADMIN — INSULIN GLARGINE-YFGN 5 UNIT(S): 100 INJECTION, SOLUTION SUBCUTANEOUS at 21:06

## 2025-01-03 RX ADMIN — GABAPENTIN 300 MILLIGRAM(S): 300 CAPSULE ORAL at 05:43

## 2025-01-03 RX ADMIN — Medication 750 MILLIGRAM(S): at 05:43

## 2025-01-03 RX ADMIN — ACETAMINOPHEN 975 MILLIGRAM(S): 80 SOLUTION/ DROPS ORAL at 18:15

## 2025-01-03 RX ADMIN — KETOROLAC TROMETHAMINE 15 MILLIGRAM(S): 30 INJECTION INTRAMUSCULAR; INTRAVENOUS at 20:59

## 2025-01-03 RX ADMIN — ACETAMINOPHEN 975 MILLIGRAM(S): 80 SOLUTION/ DROPS ORAL at 11:58

## 2025-01-03 RX ADMIN — ACETAMINOPHEN 975 MILLIGRAM(S): 80 SOLUTION/ DROPS ORAL at 12:58

## 2025-01-03 RX ADMIN — Medication 750 MILLIGRAM(S): at 23:09

## 2025-01-03 RX ADMIN — Medication 5 MILLIGRAM(S): at 21:00

## 2025-01-03 RX ADMIN — KETOROLAC TROMETHAMINE 15 MILLIGRAM(S): 30 INJECTION INTRAMUSCULAR; INTRAVENOUS at 21:00

## 2025-01-03 RX ADMIN — Medication 750 MILLIGRAM(S): at 11:58

## 2025-01-03 RX ADMIN — LIDOCAINE 2 PATCH: 50 OINTMENT TOPICAL at 11:00

## 2025-01-03 RX ADMIN — ACETAMINOPHEN 975 MILLIGRAM(S): 80 SOLUTION/ DROPS ORAL at 05:43

## 2025-01-03 RX ADMIN — LIDOCAINE 2 PATCH: 50 OINTMENT TOPICAL at 23:09

## 2025-01-03 NOTE — PROGRESS NOTE ADULT - SUBJECTIVE AND OBJECTIVE BOX
SUBJECTIVE / 24H EVENTS:    Pt seen and examined at bedside by the team during rounds. Pt found to be resting in bed comfortably with no new acute complaints at the time of examination. Pt denies CP, SOB, N/V, fever, chills.     MEDICATIONS  (STANDING):  acetaminophen     Tablet .. 975 milliGRAM(s) Oral every 6 hours  dextrose 5%. 1000 milliLiter(s) (50 mL/Hr) IV Continuous <Continuous>  dextrose 5%. 1000 milliLiter(s) (100 mL/Hr) IV Continuous <Continuous>  dextrose 50% Injectable 25 Gram(s) IV Push once  dextrose 50% Injectable 12.5 Gram(s) IV Push once  dextrose 50% Injectable 25 Gram(s) IV Push once  enoxaparin Injectable 40 milliGRAM(s) SubCutaneous every 24 hours  gabapentin 300 milliGRAM(s) Oral every 8 hours  glucagon  Injectable 1 milliGRAM(s) IntraMuscular once  influenza   Vaccine 0.5 milliLiter(s) IntraMuscular once  insulin glargine Injectable (LANTUS) 5 Unit(s) SubCutaneous at bedtime  insulin lispro (ADMELOG) corrective regimen sliding scale   SubCutaneous Before meals and at bedtime  levothyroxine 100 MICROGram(s) Oral daily  lidocaine   4% Patch 2 Patch Transdermal every 24 hours  methocarbamol 750 milliGRAM(s) Oral every 6 hours  polyethylene glycol 3350 17 Gram(s) Oral daily    MEDICATIONS  (PRN):  dextrose Oral Gel 15 Gram(s) Oral once PRN Blood Glucose LESS THAN 70 milliGRAM(s)/deciliter      Vital Signs Last 24 Hrs  T(C): 36.4 (03 Jan 2025 04:28), Max: 37.1 (02 Jan 2025 13:30)  T(F): 97.6 (03 Jan 2025 04:28), Max: 98.7 (02 Jan 2025 13:30)  HR: 55 (03 Jan 2025 04:28) (55 - 73)  BP: 115/73 (03 Jan 2025 04:28) (114/67 - 135/91)  BP(mean): --  RR: 17 (03 Jan 2025 04:28) (17 - 18)  SpO2: 91% (03 Jan 2025 04:28) (91% - 96%)    Parameters below as of 03 Jan 2025 04:28  Patient On (Oxygen Delivery Method): room air      Physical Exam  Constitutional: patient appears comfortable resting in bed, in no apparent distress  Respiratory: respirations are unlabored, no accessory muscle use, no conversational dyspnea  Cardiovascular: regular rate & rhythm  Gastrointestinal: abdomen is soft & non-distended, non-tender, no rebound tenderness / guarding  Musculoskeletal:  RLE in splint, BUE splinted, endorsing left arm tenderness  Skin: mucous membranes moist, no diaphoresis, pallor, cyanosis or jaundice      I&O's Detail      LABS:

## 2025-01-03 NOTE — PROGRESS NOTE ADULT - NS ATTEND AMEND GEN_ALL_CORE FT
Patient evaluated by me on AM rounds.   Complains of new L arm pain. Obtain L humerus dx.   DC planning to [facility].

## 2025-01-04 LAB
GLUCOSE BLDC GLUCOMTR-MCNC: 139 MG/DL — HIGH (ref 70–99)
GLUCOSE BLDC GLUCOMTR-MCNC: 156 MG/DL — HIGH (ref 70–99)
GLUCOSE BLDC GLUCOMTR-MCNC: 158 MG/DL — HIGH (ref 70–99)
GLUCOSE BLDC GLUCOMTR-MCNC: 177 MG/DL — HIGH (ref 70–99)

## 2025-01-04 PROCEDURE — 99231 SBSQ HOSP IP/OBS SF/LOW 25: CPT

## 2025-01-04 RX ADMIN — GABAPENTIN 300 MILLIGRAM(S): 300 CAPSULE ORAL at 05:25

## 2025-01-04 RX ADMIN — GABAPENTIN 300 MILLIGRAM(S): 300 CAPSULE ORAL at 12:24

## 2025-01-04 RX ADMIN — ACETAMINOPHEN 975 MILLIGRAM(S): 80 SOLUTION/ DROPS ORAL at 12:34

## 2025-01-04 RX ADMIN — ENOXAPARIN SODIUM 40 MILLIGRAM(S): 60 INJECTION INTRAVENOUS; SUBCUTANEOUS at 05:26

## 2025-01-04 RX ADMIN — LIDOCAINE 2 PATCH: 50 OINTMENT TOPICAL at 11:00

## 2025-01-04 RX ADMIN — GABAPENTIN 300 MILLIGRAM(S): 300 CAPSULE ORAL at 21:23

## 2025-01-04 RX ADMIN — Medication 750 MILLIGRAM(S): at 05:25

## 2025-01-04 RX ADMIN — Medication 4: at 18:54

## 2025-01-04 RX ADMIN — ACETAMINOPHEN 975 MILLIGRAM(S): 80 SOLUTION/ DROPS ORAL at 19:58

## 2025-01-04 RX ADMIN — ACETAMINOPHEN 975 MILLIGRAM(S): 80 SOLUTION/ DROPS ORAL at 05:25

## 2025-01-04 RX ADMIN — ACETAMINOPHEN 975 MILLIGRAM(S): 80 SOLUTION/ DROPS ORAL at 18:58

## 2025-01-04 RX ADMIN — Medication 750 MILLIGRAM(S): at 11:34

## 2025-01-04 RX ADMIN — INSULIN GLARGINE-YFGN 5 UNIT(S): 100 INJECTION, SOLUTION SUBCUTANEOUS at 21:20

## 2025-01-04 RX ADMIN — Medication 5 MILLIGRAM(S): at 21:21

## 2025-01-04 RX ADMIN — Medication 4: at 21:20

## 2025-01-04 RX ADMIN — LEVOTHYROXINE SODIUM 100 MICROGRAM(S): 175 TABLET ORAL at 05:25

## 2025-01-04 RX ADMIN — LIDOCAINE 2 PATCH: 50 OINTMENT TOPICAL at 23:15

## 2025-01-04 RX ADMIN — Medication 4: at 12:20

## 2025-01-04 RX ADMIN — LIDOCAINE 2 PATCH: 50 OINTMENT TOPICAL at 07:00

## 2025-01-04 RX ADMIN — Medication 750 MILLIGRAM(S): at 18:58

## 2025-01-04 RX ADMIN — ACETAMINOPHEN 975 MILLIGRAM(S): 80 SOLUTION/ DROPS ORAL at 11:34

## 2025-01-04 RX ADMIN — ACETAMINOPHEN 975 MILLIGRAM(S): 80 SOLUTION/ DROPS ORAL at 05:30

## 2025-01-04 NOTE — PROGRESS NOTE ADULT - SUBJECTIVE AND OBJECTIVE BOX
Subjective:   Pt seen and examined at bedside by the team during rounds. Pt found to be resting in bed comfortably with no new acute complaints at the time of examination. Pt denies CP, SOB, N/V, fever, chills.       MEDICATIONS  (STANDING):  acetaminophen     Tablet .. 975 milliGRAM(s) Oral every 6 hours  dextrose 5%. 1000 milliLiter(s) (100 mL/Hr) IV Continuous <Continuous>  dextrose 5%. 1000 milliLiter(s) (50 mL/Hr) IV Continuous <Continuous>  dextrose 50% Injectable 25 Gram(s) IV Push once  dextrose 50% Injectable 12.5 Gram(s) IV Push once  dextrose 50% Injectable 25 Gram(s) IV Push once  enoxaparin Injectable 40 milliGRAM(s) SubCutaneous every 24 hours  gabapentin 300 milliGRAM(s) Oral every 8 hours  glucagon  Injectable 1 milliGRAM(s) IntraMuscular once  influenza   Vaccine 0.5 milliLiter(s) IntraMuscular once  insulin glargine Injectable (LANTUS) 5 Unit(s) SubCutaneous at bedtime  insulin lispro (ADMELOG) corrective regimen sliding scale   SubCutaneous Before meals and at bedtime  levothyroxine 100 MICROGram(s) Oral daily  lidocaine   4% Patch 2 Patch Transdermal every 24 hours  melatonin 5 milliGRAM(s) Oral at bedtime  methocarbamol 750 milliGRAM(s) Oral every 6 hours  polyethylene glycol 3350 17 Gram(s) Oral daily    MEDICATIONS  (PRN):  dextrose Oral Gel 15 Gram(s) Oral once PRN Blood Glucose LESS THAN 70 milliGRAM(s)/deciliter      Vital Signs Last 24 Hrs  T(C): 36.7 (03 Jan 2025 23:57), Max: 36.8 (03 Jan 2025 16:00)  T(F): 98.1 (03 Jan 2025 23:57), Max: 98.2 (03 Jan 2025 16:00)  HR: 60 (03 Jan 2025 23:57) (55 - 65)  BP: 135/75 (03 Jan 2025 23:57) (109/71 - 135/75)  BP(mean): --  RR: 18 (03 Jan 2025 23:57) (17 - 18)  SpO2: 93% (03 Jan 2025 23:57) (91% - 97%)    Parameters below as of 03 Jan 2025 23:57  Patient On (Oxygen Delivery Method): room air        Physical Exam:    Constitutional: patient appears comfortable resting in bed, in no apparent distress  Respiratory: respirations are unlabored, no accessory muscle use, no conversational dyspnea  Cardiovascular: regular rate & rhythm  Gastrointestinal: abdomen is soft & non-distended, non-tender, no rebound tenderness / guarding  Musculoskeletal:  RLE in splint, BUE splinted, endorsing left arm tenderness  Skin: mucous membranes moist, no diaphoresis, pallor, cyanosis or jaundic    LABS:

## 2025-01-04 NOTE — PROGRESS NOTE ADULT - NS ATTEND AMEND GEN_ALL_CORE FT
Above assessment noted.  The patient was seen and examined by myself with the surgical PA.  The patient is without new complaints or events.  The patient remains trauma stable for discharge planning.

## 2025-01-05 LAB
ANION GAP SERPL CALC-SCNC: 14 MMOL/L — SIGNIFICANT CHANGE UP (ref 5–17)
BASOPHILS # BLD AUTO: 0.03 K/UL — SIGNIFICANT CHANGE UP (ref 0–0.2)
BASOPHILS NFR BLD AUTO: 0.6 % — SIGNIFICANT CHANGE UP (ref 0–2)
BUN SERPL-MCNC: 15.7 MG/DL — SIGNIFICANT CHANGE UP (ref 8–20)
CALCIUM SERPL-MCNC: 9.1 MG/DL — SIGNIFICANT CHANGE UP (ref 8.4–10.5)
CHLORIDE SERPL-SCNC: 102 MMOL/L — SIGNIFICANT CHANGE UP (ref 96–108)
CO2 SERPL-SCNC: 22 MMOL/L — SIGNIFICANT CHANGE UP (ref 22–29)
CREAT SERPL-MCNC: 0.51 MG/DL — SIGNIFICANT CHANGE UP (ref 0.5–1.3)
EGFR: 112 ML/MIN/1.73M2 — SIGNIFICANT CHANGE UP
EOSINOPHIL # BLD AUTO: 0.52 K/UL — HIGH (ref 0–0.5)
EOSINOPHIL NFR BLD AUTO: 11.2 % — HIGH (ref 0–6)
GLUCOSE BLDC GLUCOMTR-MCNC: 135 MG/DL — HIGH (ref 70–99)
GLUCOSE BLDC GLUCOMTR-MCNC: 146 MG/DL — HIGH (ref 70–99)
GLUCOSE BLDC GLUCOMTR-MCNC: 154 MG/DL — HIGH (ref 70–99)
GLUCOSE BLDC GLUCOMTR-MCNC: 190 MG/DL — HIGH (ref 70–99)
GLUCOSE SERPL-MCNC: 153 MG/DL — HIGH (ref 70–99)
HCT VFR BLD CALC: 32.9 % — LOW (ref 34.5–45)
HGB BLD-MCNC: 10.7 G/DL — LOW (ref 11.5–15.5)
IMM GRANULOCYTES NFR BLD AUTO: 0.2 % — SIGNIFICANT CHANGE UP (ref 0–0.9)
LYMPHOCYTES # BLD AUTO: 2.07 K/UL — SIGNIFICANT CHANGE UP (ref 1–3.3)
LYMPHOCYTES # BLD AUTO: 44.6 % — HIGH (ref 13–44)
MAGNESIUM SERPL-MCNC: 2 MG/DL — SIGNIFICANT CHANGE UP (ref 1.6–2.6)
MCHC RBC-ENTMCNC: 29.1 PG — SIGNIFICANT CHANGE UP (ref 27–34)
MCHC RBC-ENTMCNC: 32.5 G/DL — SIGNIFICANT CHANGE UP (ref 32–36)
MCV RBC AUTO: 89.4 FL — SIGNIFICANT CHANGE UP (ref 80–100)
MONOCYTES # BLD AUTO: 0.35 K/UL — SIGNIFICANT CHANGE UP (ref 0–0.9)
MONOCYTES NFR BLD AUTO: 7.5 % — SIGNIFICANT CHANGE UP (ref 2–14)
NEUTROPHILS # BLD AUTO: 1.66 K/UL — LOW (ref 1.8–7.4)
NEUTROPHILS NFR BLD AUTO: 35.9 % — LOW (ref 43–77)
PHOSPHATE SERPL-MCNC: 3.5 MG/DL — SIGNIFICANT CHANGE UP (ref 2.4–4.7)
PLATELET # BLD AUTO: 273 K/UL — SIGNIFICANT CHANGE UP (ref 150–400)
POTASSIUM SERPL-MCNC: 4.1 MMOL/L — SIGNIFICANT CHANGE UP (ref 3.5–5.3)
POTASSIUM SERPL-SCNC: 4.1 MMOL/L — SIGNIFICANT CHANGE UP (ref 3.5–5.3)
RBC # BLD: 3.68 M/UL — LOW (ref 3.8–5.2)
RBC # FLD: 14.1 % — SIGNIFICANT CHANGE UP (ref 10.3–14.5)
SODIUM SERPL-SCNC: 137 MMOL/L — SIGNIFICANT CHANGE UP (ref 135–145)
WBC # BLD: 4.64 K/UL — SIGNIFICANT CHANGE UP (ref 3.8–10.5)
WBC # FLD AUTO: 4.64 K/UL — SIGNIFICANT CHANGE UP (ref 3.8–10.5)

## 2025-01-05 PROCEDURE — 99231 SBSQ HOSP IP/OBS SF/LOW 25: CPT

## 2025-01-05 RX ORDER — KETOROLAC TROMETHAMINE 30 MG/ML
15 INJECTION INTRAMUSCULAR; INTRAVENOUS ONCE
Refills: 0 | Status: DISCONTINUED | OUTPATIENT
Start: 2025-01-05 | End: 2025-01-05

## 2025-01-05 RX ADMIN — Medication 750 MILLIGRAM(S): at 18:02

## 2025-01-05 RX ADMIN — LIDOCAINE 2 PATCH: 50 OINTMENT TOPICAL at 23:28

## 2025-01-05 RX ADMIN — ACETAMINOPHEN 975 MILLIGRAM(S): 80 SOLUTION/ DROPS ORAL at 01:00

## 2025-01-05 RX ADMIN — Medication 750 MILLIGRAM(S): at 00:14

## 2025-01-05 RX ADMIN — KETOROLAC TROMETHAMINE 15 MILLIGRAM(S): 30 INJECTION INTRAMUSCULAR; INTRAVENOUS at 06:18

## 2025-01-05 RX ADMIN — Medication 4: at 08:23

## 2025-01-05 RX ADMIN — Medication 750 MILLIGRAM(S): at 12:12

## 2025-01-05 RX ADMIN — LEVOTHYROXINE SODIUM 100 MICROGRAM(S): 175 TABLET ORAL at 04:42

## 2025-01-05 RX ADMIN — ACETAMINOPHEN 975 MILLIGRAM(S): 80 SOLUTION/ DROPS ORAL at 13:12

## 2025-01-05 RX ADMIN — LIDOCAINE 2 PATCH: 50 OINTMENT TOPICAL at 11:53

## 2025-01-05 RX ADMIN — Medication 750 MILLIGRAM(S): at 23:26

## 2025-01-05 RX ADMIN — ENOXAPARIN SODIUM 40 MILLIGRAM(S): 60 INJECTION INTRAVENOUS; SUBCUTANEOUS at 06:08

## 2025-01-05 RX ADMIN — GABAPENTIN 300 MILLIGRAM(S): 300 CAPSULE ORAL at 21:23

## 2025-01-05 RX ADMIN — ACETAMINOPHEN 975 MILLIGRAM(S): 80 SOLUTION/ DROPS ORAL at 12:12

## 2025-01-05 RX ADMIN — KETOROLAC TROMETHAMINE 15 MILLIGRAM(S): 30 INJECTION INTRAMUSCULAR; INTRAVENOUS at 06:48

## 2025-01-05 RX ADMIN — Medication 17 GRAM(S): at 12:11

## 2025-01-05 RX ADMIN — ACETAMINOPHEN 975 MILLIGRAM(S): 80 SOLUTION/ DROPS ORAL at 06:48

## 2025-01-05 RX ADMIN — ACETAMINOPHEN 975 MILLIGRAM(S): 80 SOLUTION/ DROPS ORAL at 00:14

## 2025-01-05 RX ADMIN — GABAPENTIN 300 MILLIGRAM(S): 300 CAPSULE ORAL at 15:30

## 2025-01-05 RX ADMIN — ACETAMINOPHEN 975 MILLIGRAM(S): 80 SOLUTION/ DROPS ORAL at 06:05

## 2025-01-05 RX ADMIN — ACETAMINOPHEN 975 MILLIGRAM(S): 80 SOLUTION/ DROPS ORAL at 18:02

## 2025-01-05 RX ADMIN — ACETAMINOPHEN 975 MILLIGRAM(S): 80 SOLUTION/ DROPS ORAL at 23:26

## 2025-01-05 RX ADMIN — Medication 4: at 21:29

## 2025-01-05 RX ADMIN — INSULIN GLARGINE-YFGN 5 UNIT(S): 100 INJECTION, SOLUTION SUBCUTANEOUS at 21:28

## 2025-01-05 RX ADMIN — Medication 5 MILLIGRAM(S): at 21:23

## 2025-01-05 RX ADMIN — LIDOCAINE 2 PATCH: 50 OINTMENT TOPICAL at 07:18

## 2025-01-05 RX ADMIN — Medication 750 MILLIGRAM(S): at 06:04

## 2025-01-05 RX ADMIN — GABAPENTIN 300 MILLIGRAM(S): 300 CAPSULE ORAL at 06:08

## 2025-01-05 NOTE — PROGRESS NOTE ADULT - NS ATTEND AMEND GEN_ALL_CORE FT
Above assessment noted.  The patient was seen and examined by myself with the surgical PA.  The patient is without new events or complaints.  She remains trauma stable for discharge planning.
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
## 2025-01-06 LAB
GLUCOSE BLDC GLUCOMTR-MCNC: 123 MG/DL — HIGH (ref 70–99)
GLUCOSE BLDC GLUCOMTR-MCNC: 138 MG/DL — HIGH (ref 70–99)
GLUCOSE BLDC GLUCOMTR-MCNC: 157 MG/DL — HIGH (ref 70–99)

## 2025-01-06 PROCEDURE — 73090 X-RAY EXAM OF FOREARM: CPT | Mod: 26,LT

## 2025-01-06 PROCEDURE — 73610 X-RAY EXAM OF ANKLE: CPT | Mod: 26,RT

## 2025-01-06 PROCEDURE — 73110 X-RAY EXAM OF WRIST: CPT | Mod: 26,RT

## 2025-01-06 PROCEDURE — 73060 X-RAY EXAM OF HUMERUS: CPT | Mod: 26,LT

## 2025-01-06 PROCEDURE — 99232 SBSQ HOSP IP/OBS MODERATE 35: CPT

## 2025-01-06 RX ADMIN — GABAPENTIN 300 MILLIGRAM(S): 300 CAPSULE ORAL at 12:13

## 2025-01-06 RX ADMIN — Medication 750 MILLIGRAM(S): at 12:13

## 2025-01-06 RX ADMIN — Medication 5 MILLIGRAM(S): at 22:16

## 2025-01-06 RX ADMIN — ACETAMINOPHEN 975 MILLIGRAM(S): 80 SOLUTION/ DROPS ORAL at 12:13

## 2025-01-06 RX ADMIN — ACETAMINOPHEN 975 MILLIGRAM(S): 80 SOLUTION/ DROPS ORAL at 06:26

## 2025-01-06 RX ADMIN — ACETAMINOPHEN 975 MILLIGRAM(S): 80 SOLUTION/ DROPS ORAL at 18:06

## 2025-01-06 RX ADMIN — Medication 750 MILLIGRAM(S): at 17:06

## 2025-01-06 RX ADMIN — ACETAMINOPHEN 975 MILLIGRAM(S): 80 SOLUTION/ DROPS ORAL at 00:46

## 2025-01-06 RX ADMIN — LIDOCAINE 2 PATCH: 50 OINTMENT TOPICAL at 11:57

## 2025-01-06 RX ADMIN — GABAPENTIN 300 MILLIGRAM(S): 300 CAPSULE ORAL at 22:16

## 2025-01-06 RX ADMIN — ACETAMINOPHEN 975 MILLIGRAM(S): 80 SOLUTION/ DROPS ORAL at 17:06

## 2025-01-06 RX ADMIN — LIDOCAINE 2 PATCH: 50 OINTMENT TOPICAL at 08:30

## 2025-01-06 RX ADMIN — LEVOTHYROXINE SODIUM 100 MICROGRAM(S): 175 TABLET ORAL at 04:05

## 2025-01-06 RX ADMIN — GABAPENTIN 300 MILLIGRAM(S): 300 CAPSULE ORAL at 05:01

## 2025-01-06 RX ADMIN — ACETAMINOPHEN 975 MILLIGRAM(S): 80 SOLUTION/ DROPS ORAL at 05:01

## 2025-01-06 RX ADMIN — Medication 750 MILLIGRAM(S): at 05:01

## 2025-01-06 RX ADMIN — Medication 4: at 12:15

## 2025-01-06 RX ADMIN — ENOXAPARIN SODIUM 40 MILLIGRAM(S): 60 INJECTION INTRAVENOUS; SUBCUTANEOUS at 05:01

## 2025-01-06 RX ADMIN — ACETAMINOPHEN 975 MILLIGRAM(S): 80 SOLUTION/ DROPS ORAL at 13:13

## 2025-01-06 NOTE — PROGRESS NOTE ADULT - SUBJECTIVE AND OBJECTIVE BOX
Patient seen and examined at bedside. BRENNANEON, no complaints this morning. Pain controlled.     MEDICATIONS  (STANDING):  acetaminophen     Tablet .. 975 milliGRAM(s) Oral every 6 hours  dextrose 5%. 1000 milliLiter(s) (100 mL/Hr) IV Continuous <Continuous>  dextrose 5%. 1000 milliLiter(s) (50 mL/Hr) IV Continuous <Continuous>  dextrose 50% Injectable 25 Gram(s) IV Push once  dextrose 50% Injectable 12.5 Gram(s) IV Push once  dextrose 50% Injectable 25 Gram(s) IV Push once  enoxaparin Injectable 40 milliGRAM(s) SubCutaneous every 24 hours  gabapentin 300 milliGRAM(s) Oral every 8 hours  glucagon  Injectable 1 milliGRAM(s) IntraMuscular once  influenza   Vaccine 0.5 milliLiter(s) IntraMuscular once  insulin glargine Injectable (LANTUS) 5 Unit(s) SubCutaneous at bedtime  insulin lispro (ADMELOG) corrective regimen sliding scale   SubCutaneous Before meals and at bedtime  levothyroxine 100 MICROGram(s) Oral daily  lidocaine   4% Patch 2 Patch Transdermal every 24 hours  melatonin 5 milliGRAM(s) Oral at bedtime  methocarbamol 750 milliGRAM(s) Oral every 6 hours  polyethylene glycol 3350 17 Gram(s) Oral daily    MEDICATIONS  (PRN):  dextrose Oral Gel 15 Gram(s) Oral once PRN Blood Glucose LESS THAN 70 milliGRAM(s)/deciliter                          10.7   4.64  )-----------( 273      ( 05 Jan 2025 06:39 )             32.9     01-05     137  |  102  |  15.7  ----------------------------<  153[H]  4.1   |  22.0  |  0.51    Ca    9.1      05 Jan 2025 06:39  Phos  3.5     01-05  Mg     2.0     01-05    I&O's Detail    05 Jan 2025 07:01  -  06 Jan 2025 07:00  --------------------------------------------------------  IN:    Oral Fluid: 240 mL  Total IN: 240 mL    OUT:  Total OUT: 0 mL    Total NET: 240 mL    Vital Signs Last 24 Hrs  T(C): 36.8 (06 Jan 2025 04:27), Max: 37 (05 Jan 2025 09:46)  T(F): 98.2 (06 Jan 2025 04:27), Max: 98.6 (05 Jan 2025 09:46)  HR: 61 (06 Jan 2025 04:27) (57 - 66)  BP: 121/71 (06 Jan 2025 04:27) (105/71 - 121/71)  BP(mean): --  RR: 18 (06 Jan 2025 04:27) (18 - 18)  SpO2: 97% (06 Jan 2025 04:27) (95% - 97%)    Physical exam:       Constitutional: patient appears comfortable resting in bed, in no apparent distress  Respiratory: respirations are unlabored, no accessory muscle use, no conversational dyspnea  Cardiovascular: regular rate & rhythm  Gastrointestinal: abdomen is soft & non-distended, non-tender, no rebound tenderness / guarding  Musculoskeletal:  RLE in splint, BUE splinted, endorsing left arm tenderness  Skin: mucous membranes moist, no diaphoresis, pallor, cyanosis or jaundic

## 2025-01-07 LAB
GLUCOSE BLDC GLUCOMTR-MCNC: 135 MG/DL — HIGH (ref 70–99)
GLUCOSE BLDC GLUCOMTR-MCNC: 139 MG/DL — HIGH (ref 70–99)
GLUCOSE BLDC GLUCOMTR-MCNC: 169 MG/DL — HIGH (ref 70–99)
GLUCOSE BLDC GLUCOMTR-MCNC: 231 MG/DL — HIGH (ref 70–99)

## 2025-01-07 PROCEDURE — 99232 SBSQ HOSP IP/OBS MODERATE 35: CPT

## 2025-01-07 RX ADMIN — Medication 750 MILLIGRAM(S): at 05:55

## 2025-01-07 RX ADMIN — Medication 4: at 17:00

## 2025-01-07 RX ADMIN — Medication 750 MILLIGRAM(S): at 00:23

## 2025-01-07 RX ADMIN — GABAPENTIN 300 MILLIGRAM(S): 300 CAPSULE ORAL at 11:57

## 2025-01-07 RX ADMIN — ENOXAPARIN SODIUM 40 MILLIGRAM(S): 60 INJECTION INTRAVENOUS; SUBCUTANEOUS at 05:56

## 2025-01-07 RX ADMIN — ACETAMINOPHEN 975 MILLIGRAM(S): 80 SOLUTION/ DROPS ORAL at 17:01

## 2025-01-07 RX ADMIN — ACETAMINOPHEN 975 MILLIGRAM(S): 80 SOLUTION/ DROPS ORAL at 05:55

## 2025-01-07 RX ADMIN — INSULIN GLARGINE-YFGN 5 UNIT(S): 100 INJECTION, SOLUTION SUBCUTANEOUS at 00:24

## 2025-01-07 RX ADMIN — ACETAMINOPHEN 975 MILLIGRAM(S): 80 SOLUTION/ DROPS ORAL at 12:57

## 2025-01-07 RX ADMIN — ACETAMINOPHEN 975 MILLIGRAM(S): 80 SOLUTION/ DROPS ORAL at 00:22

## 2025-01-07 RX ADMIN — LIDOCAINE 2 PATCH: 50 OINTMENT TOPICAL at 00:28

## 2025-01-07 RX ADMIN — ACETAMINOPHEN 975 MILLIGRAM(S): 80 SOLUTION/ DROPS ORAL at 06:45

## 2025-01-07 RX ADMIN — ACETAMINOPHEN 975 MILLIGRAM(S): 80 SOLUTION/ DROPS ORAL at 18:01

## 2025-01-07 RX ADMIN — ACETAMINOPHEN 975 MILLIGRAM(S): 80 SOLUTION/ DROPS ORAL at 11:57

## 2025-01-07 RX ADMIN — ACETAMINOPHEN 975 MILLIGRAM(S): 80 SOLUTION/ DROPS ORAL at 01:00

## 2025-01-07 RX ADMIN — Medication 6: at 11:57

## 2025-01-07 RX ADMIN — LEVOTHYROXINE SODIUM 100 MICROGRAM(S): 175 TABLET ORAL at 05:55

## 2025-01-07 RX ADMIN — Medication 17 GRAM(S): at 11:58

## 2025-01-07 RX ADMIN — Medication 750 MILLIGRAM(S): at 11:57

## 2025-01-07 RX ADMIN — GABAPENTIN 300 MILLIGRAM(S): 300 CAPSULE ORAL at 21:37

## 2025-01-07 RX ADMIN — Medication 750 MILLIGRAM(S): at 17:01

## 2025-01-07 RX ADMIN — GABAPENTIN 300 MILLIGRAM(S): 300 CAPSULE ORAL at 05:54

## 2025-01-07 NOTE — CHART NOTE - NSCHARTNOTEFT_GEN_A_CORE
Patient was fit and delivered a pneumatic walking boot CAM type with protective type socks for skin protection and hygiene. The CAM boot will stabilize and control the RLE and allow for safer ambulation. care use and function were explained to the patient.Contact info was provided. All went without incident.   [facility]  [phone]

## 2025-01-07 NOTE — PROGRESS NOTE ADULT - SUBJECTIVE AND OBJECTIVE BOX
HPI/OVERNIGHT EVENTS: Patient seen and examined at bedside this AM. No overnight events. No complaints. ambulating to bathroom   Vital Signs Last 24 Hrs  T(C): 36.8 (07 Jan 2025 10:45), Max: 36.8 (07 Jan 2025 00:26)  T(F): 98.2 (07 Jan 2025 10:45), Max: 98.3 (07 Jan 2025 05:06)  HR: 73 (07 Jan 2025 10:45) (56 - 73)  BP: 135/88 (07 Jan 2025 10:45) (122/67 - 135/88)  BP(mean): --  RR: 18 (07 Jan 2025 10:45) (18 - 19)  SpO2: 94% (07 Jan 2025 10:45) (94% - 96%)    Parameters below as of 07 Jan 2025 10:45  Patient On (Oxygen Delivery Method): room air        I&O's Detail    06 Jan 2025 07:01  -  07 Jan 2025 07:00  --------------------------------------------------------  IN:    Oral Fluid: 960 mL  Total IN: 960 mL    OUT:  Total OUT: 0 mL    Total NET: 960 mL          Constitutional: patient appears comfortable resting in bed, in no apparent distress  Respiratory: respirations are unlabored, no accessory muscle use, no conversational dyspnea  Cardiovascular: regular rate & rhythm  Gastrointestinal: abdomen is soft & non-distended, non-tender, no rebound tenderness / guarding  Musculoskeletal:  RLE in splint, BUE splinted, endorsing left arm tenderness      LABS:                MEDICATIONS  (STANDING):  acetaminophen     Tablet .. 975 milliGRAM(s) Oral every 6 hours  dextrose 5%. 1000 milliLiter(s) (100 mL/Hr) IV Continuous <Continuous>  dextrose 5%. 1000 milliLiter(s) (50 mL/Hr) IV Continuous <Continuous>  dextrose 50% Injectable 25 Gram(s) IV Push once  dextrose 50% Injectable 12.5 Gram(s) IV Push once  dextrose 50% Injectable 25 Gram(s) IV Push once  enoxaparin Injectable 40 milliGRAM(s) SubCutaneous every 24 hours  gabapentin 300 milliGRAM(s) Oral every 8 hours  glucagon  Injectable 1 milliGRAM(s) IntraMuscular once  influenza   Vaccine 0.5 milliLiter(s) IntraMuscular once  insulin glargine Injectable (LANTUS) 5 Unit(s) SubCutaneous at bedtime  insulin lispro (ADMELOG) corrective regimen sliding scale   SubCutaneous Before meals and at bedtime  levothyroxine 100 MICROGram(s) Oral daily  lidocaine   4% Patch 2 Patch Transdermal every 24 hours  melatonin 5 milliGRAM(s) Oral at bedtime  methocarbamol 750 milliGRAM(s) Oral every 6 hours  polyethylene glycol 3350 17 Gram(s) Oral daily    MEDICATIONS  (PRN):  dextrose Oral Gel 15 Gram(s) Oral once PRN Blood Glucose LESS THAN 70 milliGRAM(s)/deciliter      MICRO:   Cultures     STUDIES:   EKG, CXR, U/S, CT, MRI

## 2025-01-07 NOTE — PROGRESS NOTE ADULT - SUBJECTIVE AND OBJECTIVE BOX
Ortho PA note   Xrays reviewed by Dr. Patel   Splints and staples/sutures removed  patient can WBAT LUE, WBAT through elbow RUE, TTWB in cam boot RLE  cam boot ordered Ortho PA note   Xrays reviewed by Dr. Patel   Splints and staples/sutures removed  patient can WBAT LUE, WBAT through elbow RUE, TTWB in cam boot RLE, remove boot while at rest   cam boot ordered

## 2025-01-08 ENCOUNTER — TRANSCRIPTION ENCOUNTER (OUTPATIENT)
Age: 55
End: 2025-01-08

## 2025-01-08 LAB
GLUCOSE BLDC GLUCOMTR-MCNC: 133 MG/DL — HIGH (ref 70–99)
GLUCOSE BLDC GLUCOMTR-MCNC: 147 MG/DL — HIGH (ref 70–99)
GLUCOSE BLDC GLUCOMTR-MCNC: 152 MG/DL — HIGH (ref 70–99)
GLUCOSE BLDC GLUCOMTR-MCNC: 154 MG/DL — HIGH (ref 70–99)

## 2025-01-08 PROCEDURE — 99232 SBSQ HOSP IP/OBS MODERATE 35: CPT

## 2025-01-08 RX ADMIN — Medication 4: at 00:46

## 2025-01-08 RX ADMIN — LIDOCAINE 2 PATCH: 50 OINTMENT TOPICAL at 00:27

## 2025-01-08 RX ADMIN — LIDOCAINE 2 PATCH: 50 OINTMENT TOPICAL at 23:55

## 2025-01-08 RX ADMIN — LIDOCAINE 2 PATCH: 50 OINTMENT TOPICAL at 12:47

## 2025-01-08 RX ADMIN — Medication 750 MILLIGRAM(S): at 12:57

## 2025-01-08 RX ADMIN — Medication 5 MILLIGRAM(S): at 23:54

## 2025-01-08 RX ADMIN — ACETAMINOPHEN 975 MILLIGRAM(S): 80 SOLUTION/ DROPS ORAL at 12:57

## 2025-01-08 RX ADMIN — ACETAMINOPHEN 975 MILLIGRAM(S): 80 SOLUTION/ DROPS ORAL at 08:32

## 2025-01-08 RX ADMIN — ACETAMINOPHEN 975 MILLIGRAM(S): 80 SOLUTION/ DROPS ORAL at 05:58

## 2025-01-08 RX ADMIN — ENOXAPARIN SODIUM 40 MILLIGRAM(S): 60 INJECTION INTRAVENOUS; SUBCUTANEOUS at 05:59

## 2025-01-08 RX ADMIN — Medication 17 GRAM(S): at 12:58

## 2025-01-08 RX ADMIN — Medication 750 MILLIGRAM(S): at 17:22

## 2025-01-08 RX ADMIN — ACETAMINOPHEN 975 MILLIGRAM(S): 80 SOLUTION/ DROPS ORAL at 14:24

## 2025-01-08 RX ADMIN — ACETAMINOPHEN 975 MILLIGRAM(S): 80 SOLUTION/ DROPS ORAL at 00:59

## 2025-01-08 RX ADMIN — GABAPENTIN 300 MILLIGRAM(S): 300 CAPSULE ORAL at 20:18

## 2025-01-08 RX ADMIN — LIDOCAINE 2 PATCH: 50 OINTMENT TOPICAL at 12:58

## 2025-01-08 RX ADMIN — GABAPENTIN 300 MILLIGRAM(S): 300 CAPSULE ORAL at 12:57

## 2025-01-08 RX ADMIN — Medication 5 MILLIGRAM(S): at 00:06

## 2025-01-08 RX ADMIN — Medication 750 MILLIGRAM(S): at 00:31

## 2025-01-08 RX ADMIN — Medication 4: at 12:58

## 2025-01-08 RX ADMIN — INSULIN GLARGINE-YFGN 5 UNIT(S): 100 INJECTION, SOLUTION SUBCUTANEOUS at 00:41

## 2025-01-08 RX ADMIN — ACETAMINOPHEN 975 MILLIGRAM(S): 80 SOLUTION/ DROPS ORAL at 00:05

## 2025-01-08 RX ADMIN — Medication 750 MILLIGRAM(S): at 23:54

## 2025-01-08 RX ADMIN — Medication 750 MILLIGRAM(S): at 05:58

## 2025-01-08 RX ADMIN — LEVOTHYROXINE SODIUM 100 MICROGRAM(S): 175 TABLET ORAL at 05:58

## 2025-01-08 RX ADMIN — ACETAMINOPHEN 975 MILLIGRAM(S): 80 SOLUTION/ DROPS ORAL at 23:54

## 2025-01-08 RX ADMIN — ACETAMINOPHEN 975 MILLIGRAM(S): 80 SOLUTION/ DROPS ORAL at 17:22

## 2025-01-08 RX ADMIN — GABAPENTIN 300 MILLIGRAM(S): 300 CAPSULE ORAL at 05:58

## 2025-01-08 NOTE — DISCHARGE NOTE NURSING/CASE MANAGEMENT/SOCIAL WORK - PATIENT PORTAL LINK FT
You can access the FollowMyHealth Patient Portal offered by [facility] by registering at the following website: [email or web address]. By joining [employer]’s FollowMyHealth portal, you will also be able to view your health information using other applications (apps) compatible with our system.

## 2025-01-08 NOTE — DISCHARGE NOTE NURSING/CASE MANAGEMENT/SOCIAL WORK - FINANCIAL ASSISTANCE
[facility] provides services at a reduced cost to those who are determined to be eligible through [facility]’s financial assistance program. Information regarding [facility]’s financial assistance program can be found by going to [email or web address] or by calling [phone].
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
## 2025-01-08 NOTE — PHYSICAL THERAPY INITIAL EVALUATION ADULT - GENERAL OBSERVATIONS, REHAB EVAL
pt seated in recliner, +iv lock, +R CAM boot
Pt received in bed with bed alarm on, 2L O2 via NC, IV, cast/ACE RLE, cast/ACE RUE, ACE LUE, purewick, cardiac monitor, , NAD. Pt agreeable to PT, reported pain in back (from being in bed), ribs, and wrists.

## 2025-01-08 NOTE — PHYSICAL THERAPY INITIAL EVALUATION ADULT - ACTIVE RANGE OF MOTION EXAMINATION, REHAB EVAL
unable to fully close/open R hand, B UE elbow shoulder WFL, L LE WFL, R LE hip/knee WFL. L ankle in CAM boot/deficits as listed below
decreased AROM BUE and RLE/Left LE Active ROM was WFL (within functional limits)/deficits as listed below

## 2025-01-08 NOTE — DISCHARGE NOTE NURSING/CASE MANAGEMENT/SOCIAL WORK - NSDCVIVACCINE_GEN_ALL_CORE_FT
Tdap; 18-Dec-2024 19:27; Khai Cabral (DONNELL); [employer]; [id number] (Exp. Date: 18-Apr-2026); IntraMuscular; Deltoid Right.; 0.5 milliLiter(s); VIS (VIS Published: 09-May-2013, VIS Presented: 18-Dec-2024);
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
## 2025-01-08 NOTE — PHYSICAL THERAPY INITIAL EVALUATION ADULT - MANUAL MUSCLE TESTING RESULTS, REHAB EVAL
L UE and Left LE WFL for gross mobility, R shoulder/elbow observed functional strength
RLE casted, LLE 5/5/grossly assessed due to

## 2025-01-08 NOTE — DISCHARGE NOTE NURSING/CASE MANAGEMENT/SOCIAL WORK - NSSCNAMETXT_GEN_ALL_CORE
[facility] Clear bilaterally, pupils equal, round and reactive to light. Clear bilaterally, pupils equal, round and reactive to light. EOMI

## 2025-01-08 NOTE — PHYSICAL THERAPY INITIAL EVALUATION ADULT - ADDITIONAL COMMENTS
as per pt, lives in an apartment with boyfriend and son, has 9 [address], 9 inside, independent prior with no DME, works as a [employer], drives
as per pt, lives in an apartment with boyfriend and son, has 9 [address], 9 inside, independent prior with no DME, works as a HHA, drives

## 2025-01-08 NOTE — PROGRESS NOTE ADULT - SUBJECTIVE AND OBJECTIVE BOX
HPI/OVERNIGHT EVENTS: Patient seen and examined at bedside this AM. No overnight events.     Vital Signs Last 24 Hrs  T(C): 36.7 (08 Jan 2025 05:10), Max: 37.1 (08 Jan 2025 00:07)  T(F): 98 (08 Jan 2025 05:10), Max: 98.7 (08 Jan 2025 00:07)  HR: 50 (08 Jan 2025 05:10) (50 - 73)  BP: 111/69 (08 Jan 2025 05:10) (110/69 - 135/88)  BP(mean): --  RR: 18 (08 Jan 2025 05:10) (18 - 18)  SpO2: 94% (08 Jan 2025 05:10) (94% - 94%)    Parameters below as of 08 Jan 2025 05:10  Patient On (Oxygen Delivery Method): room air        I&O's Detail      Constitutional: patient appears comfortable resting in bed, in no apparent distress  Respiratory: respirations are unlabored, no accessory muscle use, no conversational dyspnea  Cardiovascular: regular rate & rhythm  Gastrointestinal: abdomen is soft & non-distended, non-tender, no rebound tenderness / guarding  Musculoskeletal:  RLE in splint, BUE splinted, endorsing left arm tenderness      LABS:                MEDICATIONS  (STANDING):  acetaminophen     Tablet .. 975 milliGRAM(s) Oral every 6 hours  dextrose 5%. 1000 milliLiter(s) (100 mL/Hr) IV Continuous <Continuous>  dextrose 5%. 1000 milliLiter(s) (50 mL/Hr) IV Continuous <Continuous>  dextrose 50% Injectable 25 Gram(s) IV Push once  dextrose 50% Injectable 12.5 Gram(s) IV Push once  dextrose 50% Injectable 25 Gram(s) IV Push once  enoxaparin Injectable 40 milliGRAM(s) SubCutaneous every 24 hours  gabapentin 300 milliGRAM(s) Oral every 8 hours  glucagon  Injectable 1 milliGRAM(s) IntraMuscular once  influenza   Vaccine 0.5 milliLiter(s) IntraMuscular once  insulin glargine Injectable (LANTUS) 5 Unit(s) SubCutaneous at bedtime  insulin lispro (ADMELOG) corrective regimen sliding scale   SubCutaneous Before meals and at bedtime  levothyroxine 100 MICROGram(s) Oral daily  lidocaine   4% Patch 2 Patch Transdermal every 24 hours  melatonin 5 milliGRAM(s) Oral at bedtime  methocarbamol 750 milliGRAM(s) Oral every 6 hours  polyethylene glycol 3350 17 Gram(s) Oral daily    MEDICATIONS  (PRN):  dextrose Oral Gel 15 Gram(s) Oral once PRN Blood Glucose LESS THAN 70 milliGRAM(s)/deciliter      MICRO:   Cultures     STUDIES:   EKG, CXR, U/S, CT, MRI

## 2025-01-08 NOTE — DISCHARGE NOTE NURSING/CASE MANAGEMENT/SOCIAL WORK - NSDCFUADDAPPT_GEN_ALL_CORE_FT
Please schedule time to see your PCP within 4 weeks after discharge.     Please call to confirm or make appointments.

## 2025-01-08 NOTE — PHYSICAL THERAPY INITIAL EVALUATION ADULT - WEIGHT-BEARING RESTRICTIONS: GAIT, REHAB EVAL
Right LE, R elbow weight bearing/toe touch weight-bearing
LLE WBAT, BUE & RLE NWB/weight-bearing as tolerated/nonweight-bearing

## 2025-01-08 NOTE — DISCHARGE NOTE NURSING/CASE MANAGEMENT/SOCIAL WORK - NSDCPEPT PROEDMA_GEN_ALL_CORE
AVS from 11/22/22      Fluids 1 L of saline today  Rv in 2 months with cbc, cmp       Rv scheduled for 1/3 @ 2:00 pm     Notified infusion rn about saline today    Pt was given AVS and appointment schedule    Electronically signed by Clint Dueñas on 11/22/2022 at 10:49 AM
Yes

## 2025-01-08 NOTE — PHYSICAL THERAPY INITIAL EVALUATION ADULT - IMPAIRMENTS FOUND, PT EVAL
gait, locomotion, and balance/gross motor/muscle strength/ROM
gait, locomotion, and balance/muscle strength/ROM
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
## 2025-01-08 NOTE — PHYSICAL THERAPY INITIAL EVALUATION ADULT - WEIGHT-BEARING RESTRICTIONS: SIT/STAND, REHAB EVAL
Right LE/toe touch weight-bearing
LLE WBAT, BUE & RLE NWB/weight-bearing as tolerated/nonweight-bearing
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
## 2025-01-09 LAB
GLUCOSE BLDC GLUCOMTR-MCNC: 121 MG/DL — HIGH (ref 70–99)
GLUCOSE BLDC GLUCOMTR-MCNC: 140 MG/DL — HIGH (ref 70–99)
GLUCOSE BLDC GLUCOMTR-MCNC: 141 MG/DL — HIGH (ref 70–99)
GLUCOSE BLDC GLUCOMTR-MCNC: 143 MG/DL — HIGH (ref 70–99)
GLUCOSE BLDC GLUCOMTR-MCNC: 192 MG/DL — HIGH (ref 70–99)

## 2025-01-09 PROCEDURE — 99232 SBSQ HOSP IP/OBS MODERATE 35: CPT

## 2025-01-09 RX ADMIN — GABAPENTIN 300 MILLIGRAM(S): 300 CAPSULE ORAL at 22:11

## 2025-01-09 RX ADMIN — LEVOTHYROXINE SODIUM 100 MICROGRAM(S): 175 TABLET ORAL at 05:03

## 2025-01-09 RX ADMIN — ACETAMINOPHEN 975 MILLIGRAM(S): 80 SOLUTION/ DROPS ORAL at 11:34

## 2025-01-09 RX ADMIN — Medication 750 MILLIGRAM(S): at 05:03

## 2025-01-09 RX ADMIN — LIDOCAINE 2 PATCH: 50 OINTMENT TOPICAL at 22:10

## 2025-01-09 RX ADMIN — INSULIN GLARGINE-YFGN 5 UNIT(S): 100 INJECTION, SOLUTION SUBCUTANEOUS at 22:10

## 2025-01-09 RX ADMIN — Medication 17 GRAM(S): at 11:36

## 2025-01-09 RX ADMIN — Medication 750 MILLIGRAM(S): at 11:34

## 2025-01-09 RX ADMIN — LIDOCAINE 2 PATCH: 50 OINTMENT TOPICAL at 11:38

## 2025-01-09 RX ADMIN — Medication 750 MILLIGRAM(S): at 23:28

## 2025-01-09 RX ADMIN — Medication 4: at 22:10

## 2025-01-09 RX ADMIN — Medication 750 MILLIGRAM(S): at 18:51

## 2025-01-09 RX ADMIN — Medication 5 MILLIGRAM(S): at 22:11

## 2025-01-09 RX ADMIN — ENOXAPARIN SODIUM 40 MILLIGRAM(S): 60 INJECTION INTRAVENOUS; SUBCUTANEOUS at 04:58

## 2025-01-09 RX ADMIN — ACETAMINOPHEN 975 MILLIGRAM(S): 80 SOLUTION/ DROPS ORAL at 23:27

## 2025-01-09 RX ADMIN — LIDOCAINE 2 PATCH: 50 OINTMENT TOPICAL at 11:37

## 2025-01-09 RX ADMIN — ACETAMINOPHEN 975 MILLIGRAM(S): 80 SOLUTION/ DROPS ORAL at 19:22

## 2025-01-09 RX ADMIN — GABAPENTIN 300 MILLIGRAM(S): 300 CAPSULE ORAL at 04:58

## 2025-01-09 RX ADMIN — INSULIN GLARGINE-YFGN 5 UNIT(S): 100 INJECTION, SOLUTION SUBCUTANEOUS at 00:03

## 2025-01-09 RX ADMIN — ACETAMINOPHEN 975 MILLIGRAM(S): 80 SOLUTION/ DROPS ORAL at 06:00

## 2025-01-09 RX ADMIN — ACETAMINOPHEN 975 MILLIGRAM(S): 80 SOLUTION/ DROPS ORAL at 18:52

## 2025-01-09 RX ADMIN — ACETAMINOPHEN 975 MILLIGRAM(S): 80 SOLUTION/ DROPS ORAL at 05:03

## 2025-01-09 RX ADMIN — ACETAMINOPHEN 975 MILLIGRAM(S): 80 SOLUTION/ DROPS ORAL at 12:05

## 2025-01-09 RX ADMIN — ACETAMINOPHEN 975 MILLIGRAM(S): 80 SOLUTION/ DROPS ORAL at 00:45

## 2025-01-09 RX ADMIN — GABAPENTIN 300 MILLIGRAM(S): 300 CAPSULE ORAL at 13:00
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
## 2025-01-10 VITALS
HEART RATE: 72 BPM | OXYGEN SATURATION: 96 % | SYSTOLIC BLOOD PRESSURE: 151 MMHG | RESPIRATION RATE: 17 BRPM | TEMPERATURE: 98 F | DIASTOLIC BLOOD PRESSURE: 83 MMHG

## 2025-01-10 LAB
GLUCOSE BLDC GLUCOMTR-MCNC: 128 MG/DL — HIGH (ref 70–99)
GLUCOSE BLDC GLUCOMTR-MCNC: 166 MG/DL — HIGH (ref 70–99)
GLUCOSE BLDC GLUCOMTR-MCNC: 180 MG/DL — HIGH (ref 70–99)

## 2025-01-10 PROCEDURE — 73610 X-RAY EXAM OF ANKLE: CPT

## 2025-01-10 PROCEDURE — 80053 COMPREHEN METABOLIC PANEL: CPT

## 2025-01-10 PROCEDURE — 87641 MR-STAPH DNA AMP PROBE: CPT

## 2025-01-10 PROCEDURE — 85730 THROMBOPLASTIN TIME PARTIAL: CPT

## 2025-01-10 PROCEDURE — 70498 CT ANGIOGRAPHY NECK: CPT | Mod: MC

## 2025-01-10 PROCEDURE — 96374 THER/PROPH/DIAG INJ IV PUSH: CPT

## 2025-01-10 PROCEDURE — 76000 FLUOROSCOPY <1 HR PHYS/QHP: CPT

## 2025-01-10 PROCEDURE — 81001 URINALYSIS AUTO W/SCOPE: CPT

## 2025-01-10 PROCEDURE — 73090 X-RAY EXAM OF FOREARM: CPT

## 2025-01-10 PROCEDURE — 81003 URINALYSIS AUTO W/O SCOPE: CPT

## 2025-01-10 PROCEDURE — 36415 COLL VENOUS BLD VENIPUNCTURE: CPT

## 2025-01-10 PROCEDURE — 70496 CT ANGIOGRAPHY HEAD: CPT | Mod: MC

## 2025-01-10 PROCEDURE — 99232 SBSQ HOSP IP/OBS MODERATE 35: CPT

## 2025-01-10 PROCEDURE — 85610 PROTHROMBIN TIME: CPT

## 2025-01-10 PROCEDURE — 83605 ASSAY OF LACTIC ACID: CPT

## 2025-01-10 PROCEDURE — 97530 THERAPEUTIC ACTIVITIES: CPT

## 2025-01-10 PROCEDURE — 73060 X-RAY EXAM OF HUMERUS: CPT

## 2025-01-10 PROCEDURE — 80048 BASIC METABOLIC PNL TOTAL CA: CPT

## 2025-01-10 PROCEDURE — 86901 BLOOD TYPING SEROLOGIC RH(D): CPT

## 2025-01-10 PROCEDURE — C9399: CPT

## 2025-01-10 PROCEDURE — 73600 X-RAY EXAM OF ANKLE: CPT

## 2025-01-10 PROCEDURE — 87640 STAPH A DNA AMP PROBE: CPT

## 2025-01-10 PROCEDURE — 93005 ELECTROCARDIOGRAM TRACING: CPT

## 2025-01-10 PROCEDURE — 97116 GAIT TRAINING THERAPY: CPT

## 2025-01-10 PROCEDURE — 82962 GLUCOSE BLOOD TEST: CPT

## 2025-01-10 PROCEDURE — C1713: CPT

## 2025-01-10 PROCEDURE — 74177 CT ABD & PELVIS W/CONTRAST: CPT | Mod: MC

## 2025-01-10 PROCEDURE — 84484 ASSAY OF TROPONIN QUANT: CPT

## 2025-01-10 PROCEDURE — 73700 CT LOWER EXTREMITY W/O DYE: CPT | Mod: MC

## 2025-01-10 PROCEDURE — 85025 COMPLETE CBC W/AUTO DIFF WBC: CPT

## 2025-01-10 PROCEDURE — 86900 BLOOD TYPING SEROLOGIC ABO: CPT

## 2025-01-10 PROCEDURE — 83690 ASSAY OF LIPASE: CPT

## 2025-01-10 PROCEDURE — 97163 PT EVAL HIGH COMPLEX 45 MIN: CPT

## 2025-01-10 PROCEDURE — 84100 ASSAY OF PHOSPHORUS: CPT

## 2025-01-10 PROCEDURE — 96375 TX/PRO/DX INJ NEW DRUG ADDON: CPT

## 2025-01-10 PROCEDURE — 72125 CT NECK SPINE W/O DYE: CPT | Mod: MC

## 2025-01-10 PROCEDURE — 97110 THERAPEUTIC EXERCISES: CPT

## 2025-01-10 PROCEDURE — 99285 EMERGENCY DEPT VISIT HI MDM: CPT | Mod: 25

## 2025-01-10 PROCEDURE — 83036 HEMOGLOBIN GLYCOSYLATED A1C: CPT

## 2025-01-10 PROCEDURE — 73110 X-RAY EXAM OF WRIST: CPT

## 2025-01-10 PROCEDURE — 70450 CT HEAD/BRAIN W/O DYE: CPT | Mod: MC

## 2025-01-10 PROCEDURE — 90715 TDAP VACCINE 7 YRS/> IM: CPT

## 2025-01-10 PROCEDURE — 85027 COMPLETE CBC AUTOMATED: CPT

## 2025-01-10 PROCEDURE — 71045 X-RAY EXAM CHEST 1 VIEW: CPT

## 2025-01-10 PROCEDURE — 71260 CT THORAX DX C+: CPT | Mod: MC

## 2025-01-10 PROCEDURE — 80307 DRUG TEST PRSMV CHEM ANLYZR: CPT

## 2025-01-10 PROCEDURE — 86850 RBC ANTIBODY SCREEN: CPT

## 2025-01-10 PROCEDURE — 83735 ASSAY OF MAGNESIUM: CPT

## 2025-01-10 PROCEDURE — 94760 N-INVAS EAR/PLS OXIMETRY 1: CPT

## 2025-01-10 RX ORDER — GABAPENTIN 300 MG/1
1 CAPSULE ORAL
Qty: 90 | Refills: 0
Start: 2025-01-10 | End: 2025-02-08

## 2025-01-10 RX ORDER — METHOCARBAMOL 500 MG
1 TABLET ORAL
Qty: 90 | Refills: 0
Start: 2025-01-10 | End: 2025-02-08

## 2025-01-10 RX ORDER — ENOXAPARIN SODIUM 60 MG/.6ML
40 INJECTION INTRAVENOUS; SUBCUTANEOUS
Qty: 7 | Refills: 0
Start: 2025-01-10 | End: 2025-01-16

## 2025-01-10 RX ADMIN — Medication 4: at 13:13

## 2025-01-10 RX ADMIN — ENOXAPARIN SODIUM 40 MILLIGRAM(S): 60 INJECTION INTRAVENOUS; SUBCUTANEOUS at 05:00

## 2025-01-10 RX ADMIN — ACETAMINOPHEN 975 MILLIGRAM(S): 80 SOLUTION/ DROPS ORAL at 06:45

## 2025-01-10 RX ADMIN — GABAPENTIN 300 MILLIGRAM(S): 300 CAPSULE ORAL at 05:01

## 2025-01-10 RX ADMIN — Medication 750 MILLIGRAM(S): at 05:01

## 2025-01-10 RX ADMIN — ACETAMINOPHEN 975 MILLIGRAM(S): 80 SOLUTION/ DROPS ORAL at 00:18

## 2025-01-10 RX ADMIN — ACETAMINOPHEN 975 MILLIGRAM(S): 80 SOLUTION/ DROPS ORAL at 05:00

## 2025-01-10 RX ADMIN — LEVOTHYROXINE SODIUM 100 MICROGRAM(S): 175 TABLET ORAL at 03:59

## 2025-01-10 RX ADMIN — Medication 4: at 08:27

## 2025-01-10 RX ADMIN — Medication 750 MILLIGRAM(S): at 13:12

## 2025-01-10 RX ADMIN — ACETAMINOPHEN 975 MILLIGRAM(S): 80 SOLUTION/ DROPS ORAL at 13:12

## 2025-01-10 RX ADMIN — GABAPENTIN 300 MILLIGRAM(S): 300 CAPSULE ORAL at 13:12

## 2025-01-10 NOTE — PROGRESS NOTE ADULT - PROVIDER SPECIALTY LIST ADULT
Orthopedics
Orthopedics
Surgery
Trauma Surgery
Orthopedics
Surgery
Trauma Surgery
Orthopedics
Orthopedics
Surgery
Trauma Surgery
Surgery
Surgery
Trauma Surgery
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
## 2025-01-28 ENCOUNTER — APPOINTMENT (OUTPATIENT)
Dept: ORTHOPEDIC SURGERY | Facility: CLINIC | Age: 55
End: 2025-01-28
Payer: COMMERCIAL

## 2025-01-28 DIAGNOSIS — S52.591D OTHER FRACTURES OF LOWER END OF RIGHT RADIUS, SUBSEQUENT ENCOUNTER FOR CLOSED FRACTURE WITH ROUTINE HEALING: ICD-10-CM

## 2025-01-28 DIAGNOSIS — S82.871D DISPLACED PILON FRACTURE OF RIGHT TIBIA, SUBSEQUENT ENCOUNTER FOR CLOSED FRACTURE WITH ROUTINE HEALING: ICD-10-CM

## 2025-01-28 DIAGNOSIS — S52.92XD UNSPECIFIED FRACTURE OF LEFT FOREARM, SUBSEQUENT ENCOUNTER FOR CLOSED FRACTURE WITH ROUTINE HEALING: ICD-10-CM

## 2025-01-28 PROCEDURE — 73610 X-RAY EXAM OF ANKLE: CPT | Mod: 26,RT

## 2025-01-28 PROCEDURE — 73110 X-RAY EXAM OF WRIST: CPT | Mod: 26,RT

## 2025-01-28 PROCEDURE — 73090 X-RAY EXAM OF FOREARM: CPT | Mod: 26,LT

## 2025-01-28 PROCEDURE — 99024 POSTOP FOLLOW-UP VISIT: CPT

## 2025-01-28 RX ORDER — MELOXICAM 15 MG/1
15 TABLET ORAL
Qty: 30 | Refills: 1 | Status: ACTIVE | COMMUNITY
Start: 2025-01-28 | End: 1900-01-01

## 2025-02-05 DIAGNOSIS — T81.31XA DISRUPTION OF EXTERNAL OPERATION (SURGICAL) WOUND, NOT ELSEWHERE CLASSIFIED, INITIAL ENCOUNTER: ICD-10-CM

## 2025-02-05 RX ORDER — CEPHALEXIN 500 MG/1
500 CAPSULE ORAL
Qty: 10 | Refills: 0 | Status: ACTIVE | COMMUNITY
Start: 2025-02-05 | End: 1900-01-01

## 2025-02-20 RX ORDER — METHOCARBAMOL 500 MG/1
500 TABLET, FILM COATED ORAL 3 TIMES DAILY
Qty: 30 | Refills: 1 | Status: ACTIVE | COMMUNITY
Start: 2025-02-20 | End: 1900-01-01

## 2025-02-26 ENCOUNTER — APPOINTMENT (OUTPATIENT)
Dept: ORTHOPEDIC SURGERY | Facility: CLINIC | Age: 55
End: 2025-02-26
Payer: COMMERCIAL

## 2025-02-26 DIAGNOSIS — S52.591D OTHER FRACTURES OF LOWER END OF RIGHT RADIUS, SUBSEQUENT ENCOUNTER FOR CLOSED FRACTURE WITH ROUTINE HEALING: ICD-10-CM

## 2025-02-26 DIAGNOSIS — S82.871D DISPLACED PILON FRACTURE OF RIGHT TIBIA, SUBSEQUENT ENCOUNTER FOR CLOSED FRACTURE WITH ROUTINE HEALING: ICD-10-CM

## 2025-02-26 DIAGNOSIS — S52.92XD UNSPECIFIED FRACTURE OF LEFT FOREARM, SUBSEQUENT ENCOUNTER FOR CLOSED FRACTURE WITH ROUTINE HEALING: ICD-10-CM

## 2025-02-26 PROCEDURE — 73090 X-RAY EXAM OF FOREARM: CPT | Mod: LT

## 2025-02-26 PROCEDURE — 73610 X-RAY EXAM OF ANKLE: CPT | Mod: RT

## 2025-02-26 PROCEDURE — 73110 X-RAY EXAM OF WRIST: CPT | Mod: RT

## 2025-02-26 PROCEDURE — 99024 POSTOP FOLLOW-UP VISIT: CPT

## 2025-03-26 ENCOUNTER — APPOINTMENT (OUTPATIENT)
Dept: ORTHOPEDIC SURGERY | Facility: CLINIC | Age: 55
End: 2025-03-26
Payer: MEDICAID

## 2025-03-26 VITALS
BODY MASS INDEX: 31.89 KG/M2 | WEIGHT: 180 LBS | DIASTOLIC BLOOD PRESSURE: 82 MMHG | SYSTOLIC BLOOD PRESSURE: 138 MMHG | HEIGHT: 63 IN | HEART RATE: 62 BPM

## 2025-03-26 DIAGNOSIS — S52.591D OTHER FRACTURES OF LOWER END OF RIGHT RADIUS, SUBSEQUENT ENCOUNTER FOR CLOSED FRACTURE WITH ROUTINE HEALING: ICD-10-CM

## 2025-03-26 DIAGNOSIS — M79.641 PAIN IN RIGHT HAND: ICD-10-CM

## 2025-03-26 DIAGNOSIS — S82.871D DISPLACED PILON FRACTURE OF RIGHT TIBIA, SUBSEQUENT ENCOUNTER FOR CLOSED FRACTURE WITH ROUTINE HEALING: ICD-10-CM

## 2025-03-26 PROCEDURE — 73610 X-RAY EXAM OF ANKLE: CPT | Mod: RT

## 2025-03-26 PROCEDURE — 73110 X-RAY EXAM OF WRIST: CPT | Mod: RT

## 2025-03-26 PROCEDURE — 99214 OFFICE O/P EST MOD 30 MIN: CPT

## 2025-03-26 PROCEDURE — 73090 X-RAY EXAM OF FOREARM: CPT | Mod: LT

## 2025-05-02 ENCOUNTER — APPOINTMENT (OUTPATIENT)
Dept: ORTHOPEDIC SURGERY | Facility: CLINIC | Age: 55
End: 2025-05-02
Payer: COMMERCIAL

## 2025-05-02 VITALS — BODY MASS INDEX: 31.89 KG/M2 | HEIGHT: 63 IN | WEIGHT: 180 LBS

## 2025-05-02 DIAGNOSIS — M43.10 SPONDYLOLISTHESIS, SITE UNSPECIFIED: ICD-10-CM

## 2025-05-02 DIAGNOSIS — M54.16 RADICULOPATHY, LUMBAR REGION: ICD-10-CM

## 2025-05-02 PROCEDURE — 99214 OFFICE O/P EST MOD 30 MIN: CPT

## 2025-05-28 ENCOUNTER — APPOINTMENT (OUTPATIENT)
Dept: ORTHOPEDIC SURGERY | Facility: CLINIC | Age: 55
End: 2025-05-28
Payer: MEDICAID

## 2025-05-28 VITALS
HEIGHT: 63 IN | SYSTOLIC BLOOD PRESSURE: 131 MMHG | DIASTOLIC BLOOD PRESSURE: 89 MMHG | HEART RATE: 66 BPM | WEIGHT: 180 LBS | BODY MASS INDEX: 31.89 KG/M2

## 2025-05-28 DIAGNOSIS — S82.871D DISPLACED PILON FRACTURE OF RIGHT TIBIA, SUBSEQUENT ENCOUNTER FOR CLOSED FRACTURE WITH ROUTINE HEALING: ICD-10-CM

## 2025-05-28 DIAGNOSIS — M79.641 PAIN IN RIGHT HAND: ICD-10-CM

## 2025-05-28 DIAGNOSIS — M25.531 PAIN IN RIGHT WRIST: ICD-10-CM

## 2025-05-28 DIAGNOSIS — S52.591D OTHER FRACTURES OF LOWER END OF RIGHT RADIUS, SUBSEQUENT ENCOUNTER FOR CLOSED FRACTURE WITH ROUTINE HEALING: ICD-10-CM

## 2025-05-28 PROCEDURE — G2211 COMPLEX E/M VISIT ADD ON: CPT | Mod: NC

## 2025-05-28 PROCEDURE — 73090 X-RAY EXAM OF FOREARM: CPT | Mod: LT

## 2025-05-28 PROCEDURE — 99214 OFFICE O/P EST MOD 30 MIN: CPT

## 2025-05-28 PROCEDURE — 73610 X-RAY EXAM OF ANKLE: CPT | Mod: RT

## 2025-05-28 PROCEDURE — 73110 X-RAY EXAM OF WRIST: CPT | Mod: RT

## 2025-07-16 ENCOUNTER — APPOINTMENT (OUTPATIENT)
Dept: ORTHOPEDIC SURGERY | Facility: CLINIC | Age: 55
End: 2025-07-16
Payer: COMMERCIAL

## 2025-07-16 VITALS
BODY MASS INDEX: 31.89 KG/M2 | DIASTOLIC BLOOD PRESSURE: 76 MMHG | HEART RATE: 64 BPM | HEIGHT: 63 IN | SYSTOLIC BLOOD PRESSURE: 147 MMHG | WEIGHT: 180 LBS

## 2025-07-16 PROCEDURE — G2211 COMPLEX E/M VISIT ADD ON: CPT | Mod: NC

## 2025-07-16 PROCEDURE — 99214 OFFICE O/P EST MOD 30 MIN: CPT

## 2025-07-16 PROCEDURE — 73110 X-RAY EXAM OF WRIST: CPT | Mod: RT

## (undated) DEVICE — SUT MONOCRYL 2-0 27" SH UNDYED

## (undated) DEVICE — DRAPE IOBAN 33" X 23"

## (undated) DEVICE — PACK EXTREMITY

## (undated) DEVICE — ELCTR GROUNDING PAD ADULT COVIDIEN

## (undated) DEVICE — DRAPE 1/2 SHEET 40X57"

## (undated) DEVICE — DRAPE SPLIT SHEET 77" X 108"

## (undated) DEVICE — Device

## (undated) DEVICE — SOL IRR POUR H2O 1000ML

## (undated) DEVICE — DRAPE XL SHEET 77X98"

## (undated) DEVICE — SUT VICRYL PLUS 0 27" CT-2 UNDYED

## (undated) DEVICE — DRAPE MAYO STAND 23"

## (undated) DEVICE — DRILL BIT SYNTHES ORTHO QC 2.5X110MM

## (undated) DEVICE — WARMING BLANKET UPPER ADULT

## (undated) DEVICE — BLADE SURGICAL #15 CARBON

## (undated) DEVICE — VENODYNE/SCD SLEEVE CALF MEDIUM

## (undated) DEVICE — DRAPE C ARM UNIVERSAL

## (undated) DEVICE — DRILL BIT SYNTHES ORTHO 1.8MM

## (undated) DEVICE — SOL IRR POUR NS 0.9% 1000ML

## (undated) DEVICE — TOURNIQUET ESMARK 6"

## (undated) DEVICE — FRAZIER SUCTION TIP 10FR

## (undated) DEVICE — GLV 8 PROTEXIS (BLUE)

## (undated) DEVICE — GLV 7.5 PROTEXIS (WHITE)

## (undated) DEVICE — TOURNIQUET CUFF 34" DUAL PORT W PLC

## (undated) DEVICE — DRAPE C ARM C-ARMOUR

## (undated) DEVICE — NDL HYPO REGULAR BEVEL 25G X 1.5" (BLUE)

## (undated) DEVICE — SUT MONOCRYL 3-0 27" PS-2 UNDYED

## (undated) DEVICE — TOURNIQUET CUFF 30" DUAL PORT W PLC